# Patient Record
Sex: FEMALE | Race: OTHER | NOT HISPANIC OR LATINO | URBAN - METROPOLITAN AREA
[De-identification: names, ages, dates, MRNs, and addresses within clinical notes are randomized per-mention and may not be internally consistent; named-entity substitution may affect disease eponyms.]

---
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
## 2018-12-07 ENCOUNTER — TRANSCRIPTION ENCOUNTER (OUTPATIENT)
Age: 18
End: 2018-12-07

## 2018-12-07 ENCOUNTER — APPOINTMENT (OUTPATIENT)
Dept: FAMILY MEDICINE | Facility: CLINIC | Age: 18
End: 2018-12-07
Payer: MEDICAID

## 2018-12-07 VITALS
BODY MASS INDEX: 27.43 KG/M2 | HEIGHT: 68 IN | DIASTOLIC BLOOD PRESSURE: 80 MMHG | WEIGHT: 181 LBS | HEART RATE: 86 BPM | TEMPERATURE: 98.3 F | RESPIRATION RATE: 16 BRPM | OXYGEN SATURATION: 98 % | SYSTOLIC BLOOD PRESSURE: 118 MMHG

## 2018-12-07 DIAGNOSIS — Z01.84 ENCOUNTER FOR ANTIBODY RESPONSE EXAMINATION: ICD-10-CM

## 2018-12-07 DIAGNOSIS — Z30.09 ENCOUNTER FOR OTHER GENERAL COUNSELING AND ADVICE ON CONTRACEPTION: ICD-10-CM

## 2018-12-07 DIAGNOSIS — Z11.4 ENCOUNTER FOR SCREENING FOR HUMAN IMMUNODEFICIENCY VIRUS [HIV]: ICD-10-CM

## 2018-12-07 DIAGNOSIS — Z11.1 ENCOUNTER FOR SCREENING FOR RESPIRATORY TUBERCULOSIS: ICD-10-CM

## 2018-12-07 DIAGNOSIS — Z13.220 ENCOUNTER FOR SCREENING FOR LIPOID DISORDERS: ICD-10-CM

## 2018-12-07 DIAGNOSIS — Z82.49 FAMILY HISTORY OF ISCHEMIC HEART DISEASE AND OTHER DISEASES OF THE CIRCULATORY SYSTEM: ICD-10-CM

## 2018-12-07 DIAGNOSIS — Z00.00 ENCOUNTER FOR GENERAL ADULT MEDICAL EXAMINATION W/OUT ABNORMAL FINDINGS: ICD-10-CM

## 2018-12-07 DIAGNOSIS — F17.200 NICOTINE DEPENDENCE, UNSPECIFIED, UNCOMPLICATED: ICD-10-CM

## 2018-12-07 DIAGNOSIS — Z13.29 ENCOUNTER FOR SCREENING FOR OTHER SUSPECTED ENDOCRINE DISORDER: ICD-10-CM

## 2018-12-07 DIAGNOSIS — Z83.79 FAMILY HISTORY OF OTHER DISEASES OF THE DIGESTIVE SYSTEM: ICD-10-CM

## 2018-12-07 DIAGNOSIS — Z13.31 ENCOUNTER FOR SCREENING FOR DEPRESSION: ICD-10-CM

## 2018-12-07 DIAGNOSIS — Z71.6 TOBACCO ABUSE COUNSELING: ICD-10-CM

## 2018-12-07 DIAGNOSIS — Z23 ENCOUNTER FOR IMMUNIZATION: ICD-10-CM

## 2018-12-07 PROCEDURE — G0008: CPT

## 2018-12-07 PROCEDURE — 90686 IIV4 VACC NO PRSV 0.5 ML IM: CPT

## 2018-12-07 PROCEDURE — G0444 DEPRESSION SCREEN ANNUAL: CPT

## 2018-12-07 PROCEDURE — 99385 PREV VISIT NEW AGE 18-39: CPT | Mod: 25

## 2018-12-07 PROCEDURE — 36415 COLL VENOUS BLD VENIPUNCTURE: CPT

## 2018-12-07 PROCEDURE — 99406 BEHAV CHNG SMOKING 3-10 MIN: CPT

## 2018-12-07 RX ORDER — NORGESTIMATE AND ETHINYL ESTRADIOL 7DAYSX3 28
0.18/0.215/0.25 KIT ORAL DAILY
Qty: 6 | Refills: 1 | Status: ACTIVE | COMMUNITY
Start: 2018-12-07 | End: 1900-01-01

## 2018-12-07 RX ORDER — OMEPRAZOLE 40 MG/1
40 CAPSULE, DELAYED RELEASE ORAL
Qty: 30 | Refills: 0 | Status: ACTIVE | COMMUNITY
Start: 2018-12-07 | End: 1900-01-01

## 2018-12-07 NOTE — HEALTH RISK ASSESSMENT
[Good] : ~his/her~  mood as  good [No falls in past year] : Patient reported no falls in the past year [0] : 2) Feeling down, depressed, or hopeless: Not at all (0) [HIV Test offered] : HIV Test offered [None] : None [With Family] : lives with family [Employed] : employed [Single] : single [Sexually Active] : sexually active [Feels Safe at Home] : Feels safe at home [Fully functional (bathing, dressing, toileting, transferring, walking, feeding)] : Fully functional (bathing, dressing, toileting, transferring, walking, feeding) [Fully functional (using the telephone, shopping, preparing meals, housekeeping, doing laundry, using] : Fully functional and needs no help or supervision to perform IADLs (using the telephone, shopping, preparing meals, housekeeping, doing laundry, using transportation, managing medications and managing finances) [Smoke Detector] : smoke detector [Carbon Monoxide Detector] : carbon monoxide detector [Seat Belt] :  uses seat belt [Sunscreen] : uses sunscreen [Patient declined discussion] : Patient declined discussion [] : No [Change in mental status noted] : No change in mental status noted [Language] : denies difficulty with language [Behavior] : denies difficulty with behavior [Reasoning] : denies difficulty with reasoning [High Risk Behavior] : no high risk behavior [Reports changes in hearing] : Reports no changes in hearing [Reports changes in vision] : Reports no changes in vision [Reports changes in dental health] : Reports no changes in dental health [FreeTextEntry2] :

## 2018-12-07 NOTE — COUNSELING
[Healthy eating counseling provided] : healthy eating [Activity counseling provided] : activity [Good understanding] : Patient has a good understanding of lifestyle changes and the steps needed to achieve self management goals [Tobacco Use Cessation Intermediate Greater Than 3 Minutes Up to 10 Minutes] : Tobacco Use Cessation Intermediate Greater Than 3 Minutes Up to 10 Minutes [ - Annual Depression Screening] : Annual Depression Screening

## 2018-12-07 NOTE — PAST MEDICAL HISTORY
[Menstruating] : menstruating [Definite ___ (Date)] : the last menstrual period was [unfilled] [Irregular Cycle Intervals] : are  irregular

## 2018-12-07 NOTE — ASSESSMENT
[FreeTextEntry1] : Health maintenance\par - comprehensive labs\par - immunity testing and tb screen for work forms\par - flu shot given today\par - will get record of tdap \par - will go for pap when age 19\par \par Birth control\par - patient has tried depo and IUD\par - she is now having irregular periods since stopping depo\par - would like to start OCPs\par - script sent today\par \par Nausea\par - intermittent in the morning for about 1 year now\par - ? GERD\par - trial of PPI\par \par Depression screening\par - PHQ score 0\par - negative screening\par \par Smoking cessation\par - spent more than 3 minutes discussing quitting \par - patient is interested\par - will work on cutting down\par - if needed will try a medication option, either chantix or wellbutrin

## 2018-12-07 NOTE — HISTORY OF PRESENT ILLNESS
[FreeTextEntry1] : cpe [de-identified] : Patient is here today for a physical.  She had her last physical about one year ago.  She needs a form filled out for work.  \par \par She has been doing well.  No major complaints.  No chronic illnesses. \par She did have a right knee injury, meniscal tear that was repaired with arthroscopy.

## 2018-12-10 ENCOUNTER — APPOINTMENT (OUTPATIENT)
Dept: FAMILY MEDICINE | Facility: CLINIC | Age: 18
End: 2018-12-10
Payer: MEDICAID

## 2018-12-10 DIAGNOSIS — Z23 ENCOUNTER FOR IMMUNIZATION: ICD-10-CM

## 2018-12-10 LAB
25(OH)D3 SERPL-MCNC: 17.8 NG/ML
ALBUMIN SERPL ELPH-MCNC: 4.8 G/DL
ALP BLD-CCNC: 62 U/L
ALT SERPL-CCNC: 9 U/L
ANION GAP SERPL CALC-SCNC: 12 MMOL/L
AST SERPL-CCNC: 19 U/L
BASOPHILS # BLD AUTO: 0.03 K/UL
BASOPHILS NFR BLD AUTO: 0.4 %
BILIRUB SERPL-MCNC: 0.5 MG/DL
BUN SERPL-MCNC: 11 MG/DL
CALCIUM SERPL-MCNC: 9.8 MG/DL
CHLORIDE SERPL-SCNC: 107 MMOL/L
CHOLEST SERPL-MCNC: 152 MG/DL
CHOLEST/HDLC SERPL: 2.5 RATIO
CO2 SERPL-SCNC: 21 MMOL/L
CREAT SERPL-MCNC: 0.74 MG/DL
EOSINOPHIL # BLD AUTO: 0.08 K/UL
EOSINOPHIL NFR BLD AUTO: 1 %
GLUCOSE SERPL-MCNC: 93 MG/DL
HBV SURFACE AB SER QL: REACTIVE
HCT VFR BLD CALC: 43.7 %
HDLC SERPL-MCNC: 60 MG/DL
HGB BLD-MCNC: 14.3 G/DL
HIV1+2 AB SPEC QL IA.RAPID: NONREACTIVE
IMM GRANULOCYTES NFR BLD AUTO: 0.1 %
LDLC SERPL CALC-MCNC: 85 MG/DL
LYMPHOCYTES # BLD AUTO: 2.19 K/UL
LYMPHOCYTES NFR BLD AUTO: 27.3 %
M TB IFN-G BLD-IMP: NEGATIVE
MAN DIFF?: NORMAL
MCHC RBC-ENTMCNC: 29.7 PG
MCHC RBC-ENTMCNC: 32.7 GM/DL
MCV RBC AUTO: 90.9 FL
MEV IGG FLD QL IA: 9.3 AU/ML
MEV IGG+IGM SER-IMP: NEGATIVE
MONOCYTES # BLD AUTO: 0.5 K/UL
MONOCYTES NFR BLD AUTO: 6.2 %
MUV AB SER-ACNC: POSITIVE
MUV IGG SER QL IA: 12.6 AU/ML
NEUTROPHILS # BLD AUTO: 5.22 K/UL
NEUTROPHILS NFR BLD AUTO: 65 %
PLATELET # BLD AUTO: 250 K/UL
POTASSIUM SERPL-SCNC: 4.3 MMOL/L
PROT SERPL-MCNC: 7.6 G/DL
QUANTIFERON TB PLUS MITOGEN MINUS NIL: 9.55 IU/ML
QUANTIFERON TB PLUS NIL: 0.02 IU/ML
QUANTIFERON TB PLUS TB1 MINUS NIL: 0.05 IU/ML
QUANTIFERON TB PLUS TB2 MINUS NIL: 0.03 IU/ML
RBC # BLD: 4.81 M/UL
RBC # FLD: 12.8 %
RUBV IGG FLD-ACNC: 2 INDEX
RUBV IGG SER-IMP: POSITIVE
SODIUM SERPL-SCNC: 140 MMOL/L
T4 FREE SERPL-MCNC: 1.7 NG/DL
TRIGL SERPL-MCNC: 33 MG/DL
TSH SERPL-ACNC: 1.31 UIU/ML
VZV AB TITR SER: POSITIVE
VZV IGG SER IF-ACNC: 186.1 INDEX
WBC # FLD AUTO: 8.03 K/UL

## 2018-12-10 PROCEDURE — 90707 MMR VACCINE SC: CPT

## 2018-12-10 PROCEDURE — 90471 IMMUNIZATION ADMIN: CPT

## 2018-12-17 ENCOUNTER — APPOINTMENT (OUTPATIENT)
Dept: FAMILY MEDICINE | Facility: CLINIC | Age: 18
End: 2018-12-17
Payer: MEDICAID

## 2018-12-17 VITALS
TEMPERATURE: 98.4 F | HEART RATE: 82 BPM | DIASTOLIC BLOOD PRESSURE: 84 MMHG | HEIGHT: 68 IN | RESPIRATION RATE: 16 BRPM | SYSTOLIC BLOOD PRESSURE: 122 MMHG | WEIGHT: 181 LBS | OXYGEN SATURATION: 98 % | BODY MASS INDEX: 27.43 KG/M2

## 2018-12-17 DIAGNOSIS — J06.9 ACUTE UPPER RESPIRATORY INFECTION, UNSPECIFIED: ICD-10-CM

## 2018-12-17 DIAGNOSIS — R11.0 NAUSEA: ICD-10-CM

## 2018-12-17 PROCEDURE — 99214 OFFICE O/P EST MOD 30 MIN: CPT

## 2018-12-17 RX ORDER — FLUTICASONE PROPIONATE 50 UG/1
50 SPRAY, METERED NASAL
Qty: 1 | Refills: 1 | Status: ACTIVE | COMMUNITY
Start: 2018-12-17 | End: 1900-01-01

## 2018-12-17 NOTE — ASSESSMENT
[FreeTextEntry1] : URI\par - likely viral\par - resolving, fever free for over 24 hours\par - continue supportive care, flonase\par - may return to work\par \par Nausea\par - much improved with PPI\par - will continue for now

## 2018-12-17 NOTE — REVIEW OF SYSTEMS
[Sore Throat] : sore throat [Postnasal Drip] : postnasal drip [Negative] : Neurological [Nasal Discharge] : no nasal discharge [FreeTextEntry3] : watery eyes

## 2018-12-17 NOTE — HISTORY OF PRESENT ILLNESS
[FreeTextEntry8] : Patient is here today for an acute visit for cold symptoms.  She started having symptoms 5 days ago and was having fevers and sweating.  Her fever was as high as 103 F.  She was taking ibuprofen for that.  She also had sneezing, congestion, watery eyes, and mild cough.  She has been taking theraflu and nyquil which has been helping.  SHe is feeling much better and has been fever free for 2 days.  She needs a note to return to work.

## 2018-12-17 NOTE — PHYSICAL EXAM
[No Acute Distress] : no acute distress [Well Nourished] : well nourished [Well Developed] : well developed [Well-Appearing] : well-appearing [Normal Sclera/Conjunctiva] : normal sclera/conjunctiva [PERRL] : pupils equal round and reactive to light [EOMI] : extraocular movements intact [Normal Outer Ear/Nose] : the outer ears and nose were normal in appearance [Normal Oropharynx] : the oropharynx was normal [Normal TMs] : both tympanic membranes were normal [Supple] : supple [No Lymphadenopathy] : no lymphadenopathy [No Respiratory Distress] : no respiratory distress  [Clear to Auscultation] : lungs were clear to auscultation bilaterally [No Accessory Muscle Use] : no accessory muscle use [Normal Rate] : normal rate  [Regular Rhythm] : with a regular rhythm [Normal S1, S2] : normal S1 and S2 [No Murmur] : no murmur heard [Pedal Pulses Present] : the pedal pulses are present [No Edema] : there was no peripheral edema [Normal Posterior Cervical Nodes] : no posterior cervical lymphadenopathy [Normal Anterior Cervical Nodes] : no anterior cervical lymphadenopathy [No CVA Tenderness] : no CVA  tenderness [No Spinal Tenderness] : no spinal tenderness [Grossly Normal Strength/Tone] : grossly normal strength/tone [No Rash] : no rash [Normal Gait] : normal gait [Normal Affect] : the affect was normal [Normal Insight/Judgement] : insight and judgment were intact

## 2019-01-03 ENCOUNTER — APPOINTMENT (OUTPATIENT)
Dept: FAMILY MEDICINE | Facility: CLINIC | Age: 19
End: 2019-01-03

## 2019-01-22 ENCOUNTER — APPOINTMENT (OUTPATIENT)
Dept: FAMILY MEDICINE | Facility: CLINIC | Age: 19
End: 2019-01-22

## 2019-08-16 ENCOUNTER — TRANSCRIPTION ENCOUNTER (OUTPATIENT)
Age: 19
End: 2019-08-16

## 2019-11-04 ENCOUNTER — RESULT REVIEW (OUTPATIENT)
Age: 19
End: 2019-11-04

## 2020-01-20 ENCOUNTER — APPOINTMENT (OUTPATIENT)
Dept: FAMILY MEDICINE | Facility: CLINIC | Age: 20
End: 2020-01-20

## 2020-12-16 PROBLEM — J06.9 ACUTE URI: Status: RESOLVED | Noted: 2018-12-17 | Resolved: 2020-12-16

## 2021-01-12 ENCOUNTER — EMERGENCY (EMERGENCY)
Facility: HOSPITAL | Age: 21
LOS: 0 days | Discharge: HOME | End: 2021-01-13
Attending: EMERGENCY MEDICINE
Payer: MEDICAID

## 2021-01-12 VITALS — HEIGHT: 67 IN | WEIGHT: 158.07 LBS

## 2021-01-12 DIAGNOSIS — O03.9 COMPLETE OR UNSPECIFIED SPONTANEOUS ABORTION WITHOUT COMPLICATION: ICD-10-CM

## 2021-01-12 DIAGNOSIS — N93.9 ABNORMAL UTERINE AND VAGINAL BLEEDING, UNSPECIFIED: ICD-10-CM

## 2021-01-12 LAB
ALBUMIN SERPL ELPH-MCNC: 3.8 G/DL — SIGNIFICANT CHANGE UP (ref 3.3–5)
ALP SERPL-CCNC: 48 U/L — SIGNIFICANT CHANGE UP (ref 40–120)
ALT FLD-CCNC: 16 U/L — SIGNIFICANT CHANGE UP (ref 12–78)
ANION GAP SERPL CALC-SCNC: 7 MMOL/L — SIGNIFICANT CHANGE UP (ref 5–17)
AST SERPL-CCNC: 14 U/L — LOW (ref 15–37)
BASOPHILS # BLD AUTO: 0.05 K/UL — SIGNIFICANT CHANGE UP (ref 0–0.2)
BASOPHILS NFR BLD AUTO: 0.4 % — SIGNIFICANT CHANGE UP (ref 0–2)
BILIRUB SERPL-MCNC: 0.6 MG/DL — SIGNIFICANT CHANGE UP (ref 0.2–1.2)
BUN SERPL-MCNC: 8 MG/DL — SIGNIFICANT CHANGE UP (ref 7–23)
CALCIUM SERPL-MCNC: 9.1 MG/DL — SIGNIFICANT CHANGE UP (ref 8.5–10.1)
CHLORIDE SERPL-SCNC: 111 MMOL/L — HIGH (ref 96–108)
CO2 SERPL-SCNC: 23 MMOL/L — SIGNIFICANT CHANGE UP (ref 22–31)
CREAT SERPL-MCNC: 0.59 MG/DL — SIGNIFICANT CHANGE UP (ref 0.5–1.3)
EOSINOPHIL # BLD AUTO: 0.06 K/UL — SIGNIFICANT CHANGE UP (ref 0–0.5)
EOSINOPHIL NFR BLD AUTO: 0.5 % — SIGNIFICANT CHANGE UP (ref 0–6)
GLUCOSE SERPL-MCNC: 96 MG/DL — SIGNIFICANT CHANGE UP (ref 70–99)
HCG SERPL-ACNC: 943 MIU/ML — HIGH
HCT VFR BLD CALC: 36.6 % — SIGNIFICANT CHANGE UP (ref 34.5–45)
HGB BLD-MCNC: 12.6 G/DL — SIGNIFICANT CHANGE UP (ref 11.5–15.5)
IMM GRANULOCYTES NFR BLD AUTO: 0.2 % — SIGNIFICANT CHANGE UP (ref 0–1.5)
LYMPHOCYTES # BLD AUTO: 27.4 % — SIGNIFICANT CHANGE UP (ref 13–44)
LYMPHOCYTES # BLD AUTO: 3.17 K/UL — SIGNIFICANT CHANGE UP (ref 1–3.3)
MCHC RBC-ENTMCNC: 30 PG — SIGNIFICANT CHANGE UP (ref 27–34)
MCHC RBC-ENTMCNC: 34.4 GM/DL — SIGNIFICANT CHANGE UP (ref 32–36)
MCV RBC AUTO: 87.1 FL — SIGNIFICANT CHANGE UP (ref 80–100)
MONOCYTES # BLD AUTO: 0.75 K/UL — SIGNIFICANT CHANGE UP (ref 0–0.9)
MONOCYTES NFR BLD AUTO: 6.5 % — SIGNIFICANT CHANGE UP (ref 2–14)
NEUTROPHILS # BLD AUTO: 7.52 K/UL — HIGH (ref 1.8–7.4)
NEUTROPHILS NFR BLD AUTO: 65 % — SIGNIFICANT CHANGE UP (ref 43–77)
PLATELET # BLD AUTO: 253 K/UL — SIGNIFICANT CHANGE UP (ref 150–400)
POTASSIUM SERPL-MCNC: 3.8 MMOL/L — SIGNIFICANT CHANGE UP (ref 3.5–5.3)
POTASSIUM SERPL-SCNC: 3.8 MMOL/L — SIGNIFICANT CHANGE UP (ref 3.5–5.3)
PROT SERPL-MCNC: 7.1 GM/DL — SIGNIFICANT CHANGE UP (ref 6–8.3)
RBC # BLD: 4.2 M/UL — SIGNIFICANT CHANGE UP (ref 3.8–5.2)
RBC # FLD: 12.3 % — SIGNIFICANT CHANGE UP (ref 10.3–14.5)
SODIUM SERPL-SCNC: 141 MMOL/L — SIGNIFICANT CHANGE UP (ref 135–145)
WBC # BLD: 11.57 K/UL — HIGH (ref 3.8–10.5)
WBC # FLD AUTO: 11.57 K/UL — HIGH (ref 3.8–10.5)

## 2021-01-12 PROCEDURE — 86850 RBC ANTIBODY SCREEN: CPT

## 2021-01-12 PROCEDURE — 99284 EMERGENCY DEPT VISIT MOD MDM: CPT | Mod: 25

## 2021-01-12 PROCEDURE — 80053 COMPREHEN METABOLIC PANEL: CPT

## 2021-01-12 PROCEDURE — 85025 COMPLETE CBC W/AUTO DIFF WBC: CPT

## 2021-01-12 PROCEDURE — 86900 BLOOD TYPING SEROLOGIC ABO: CPT

## 2021-01-12 PROCEDURE — 96374 THER/PROPH/DIAG INJ IV PUSH: CPT

## 2021-01-12 PROCEDURE — 76801 OB US < 14 WKS SINGLE FETUS: CPT

## 2021-01-12 PROCEDURE — 76801 OB US < 14 WKS SINGLE FETUS: CPT | Mod: 26

## 2021-01-12 PROCEDURE — 86901 BLOOD TYPING SEROLOGIC RH(D): CPT

## 2021-01-12 PROCEDURE — 84702 CHORIONIC GONADOTROPIN TEST: CPT

## 2021-01-12 PROCEDURE — 85610 PROTHROMBIN TIME: CPT

## 2021-01-12 PROCEDURE — 36415 COLL VENOUS BLD VENIPUNCTURE: CPT

## 2021-01-12 PROCEDURE — 99284 EMERGENCY DEPT VISIT MOD MDM: CPT

## 2021-01-12 RX ORDER — KETOROLAC TROMETHAMINE 30 MG/ML
30 SYRINGE (ML) INJECTION ONCE
Refills: 0 | Status: DISCONTINUED | OUTPATIENT
Start: 2021-01-12 | End: 2021-01-12

## 2021-01-12 RX ORDER — ACETAMINOPHEN 500 MG
1000 TABLET ORAL ONCE
Refills: 0 | Status: COMPLETED | OUTPATIENT
Start: 2021-01-12 | End: 2021-01-12

## 2021-01-12 RX ORDER — SODIUM CHLORIDE 9 MG/ML
1000 INJECTION INTRAMUSCULAR; INTRAVENOUS; SUBCUTANEOUS ONCE
Refills: 0 | Status: COMPLETED | OUTPATIENT
Start: 2021-01-12 | End: 2021-01-12

## 2021-01-12 RX ADMIN — Medication 30 MILLIGRAM(S): at 23:21

## 2021-01-12 RX ADMIN — SODIUM CHLORIDE 1000 MILLILITER(S): 9 INJECTION INTRAMUSCULAR; INTRAVENOUS; SUBCUTANEOUS at 22:40

## 2021-01-12 RX ADMIN — Medication 1000 MILLIGRAM(S): at 23:21

## 2021-01-12 NOTE — ED STATDOCS - PHYSICAL EXAMINATION
PA NOTE: GEN: AOX3, NAD. HEENT: Throat clear. Airway is patent. EYES: PERRLA. EOMI. Head: NC/AT. NECK: Supple, No JVD. FROM. C-spine non-tender. CV:S1S2, RRR, LUNGS: Non-labored breathing, no tachypnea. O2sat 100% RA. CTA b/l. No w/r/r. CHEST: Equal chest expansion and rise. No deformity. ABD: Soft, NT/ND, no rebound, no guarding. No CVAT. EXT: No e/c/c. 2+ distal pulses. SKIN: No rashes. NEURO: No focal deficits. CN II-XII intact. FROM. 5/5 motor and sensory. ~Sp Felix PA-C

## 2021-01-12 NOTE — ED STATDOCS - PROGRESS NOTE DETAILS
PA: Patient is a 21 y/o female with PMHx of miscarriage x1 in 2020, who presents to [facility] at 13 weeks gestation c/o vaginal bleeding x2 hours PTA with clots and mild intermittent lower abd pain. LMP October 15th. DENIES N/V/D, fever or chills. Patient is scheduled to see her OB GYN in 10 days. ~Sp Felix PA-C   Patient seen and evaluated in . Will get labs, pelvic SONO. Reassess. ~Sp Felix PA-C   OBGYN: Dr. Cardoso PA note: Pelvic SONO +IUP without fetal pole. T&S O+keira antibody NEG. All labwork and studies reviewed in details with patient. Patient re-examined and re-evaluated. Patient feels much better at this time. ED evaluation, Diagnosis and management discussed with the patient in detail. Workup results discussed with ED attending YURIDIA Orozco to [place] home. Close OB GYN follow up encouraged.  Strict ED return instructions discussed in detail and patient given the opportunity to ask any questions about their discharge diagnosis and instructions. Patient verbalized understanding. ~ Sp Felix PA-C

## 2021-01-12 NOTE — ED STATDOCS - PATIENT PORTAL LINK FT
You can access the FollowMyHealth Patient Portal offered by [facility] by registering at the following website: [email or web address]. By joining [employer]s FollowMyHealth portal, you will also be able to view your health information using other applications (apps) compatible with our system.

## 2021-01-12 NOTE — ED STATDOCS - OBJECTIVE STATEMENT
19 y/o F with no pertinent PMHx presenting to the ED 13 weeks pregnant(G:3 P:0 A:2) presenting to the ED c/o bleeding x2 hours PTA. +mild intermittent abd pain Last menstrual period October 15th. Came to the ED for further evaluation. Denies any N/V/D, fever or chills.   OBGYN: Dr. Cardoso

## 2021-01-12 NOTE — ED STATDOCS - ATTENDING CONTRIBUTION TO CARE
Dr. Mendoza: I performed a face to face bedside interview with patient regarding history of present illness, review of symptoms and past medical history. I completed an independent physical exam.  I have discussed patient's plan of care with PA.   I agree with note as stated above, having amended the EMR as needed to reflect my findings.   This includes HISTORY OF PRESENT ILLNESS, HIV, PAST MEDICAL/SURGICAL/FAMILY/SOCIAL HISTORY, ALLERGIES AND HOME MEDICATIONS, REVIEW OF SYSTEMS, PHYSICAL EXAM, and any PROGRESS NOTES during the time I functioned as the attending physician for this patient.

## 2021-01-12 NOTE — ED STATDOCS - CLINICAL SUMMARY MEDICAL DECISION MAKING FREE TEXT BOX
Patient with vaginal bleeding while pregnant, threatened AB. Plan: blood work  including type in screen, US to r/o AB. Patient with vaginal bleeding while pregnant, threatened AB. Plan: blood work  including type in screen, US to r/o AB.      PA: Patient is a 21 y/o female with PMHx of miscarriage x1 in 2020, who presents to [facility] at 13 weeks gestation c/o vaginal bleeding x2 hours PTA with clots and mild intermittent lower abd pain. Pelvic sono +IUP without fetal pole, gestational age of 8 weeks/5 days. No FHR. Patient notified of sono results by dr. Mendoza. Waiting on labs, T&S. Care transitioned to dr. Mendoza. ~Sp Felix PA-C Patient with vaginal bleeding while pregnant, threatened AB. Plan: blood work  including type in screen, US to r/o AB.      1/12/2021 2350 hrs PA: Patient is a 19 y/o female with PMHx of miscarriage x1 in 2020, who presents to [facility] at 13 weeks gestation c/o vaginal bleeding x2 hours PTA with clots and mild intermittent lower abd pain. Pelvic sono +IUP without fetal pole, gestational age of 8 weeks/5 days. No FHR. Patient notified of sono results by dr. Mendoza. Waiting on labs, T&S. Care transitioned to dr. Mendoza. ~Sp Felix PA-C    1/13/2021 00:28 hrs PA note: Pelvic SONO +IUP without fetal pole. T&S O+keira antibody NEG. All labwork and studies reviewed in details with patient. Patient re-examined and re-evaluated. Patient feels much better at this time. ED evaluation, Diagnosis and management discussed with the patient in detail. Workup results discussed with ED attending Dr. Mendoza, [place] to [place] home. Close OB GYN follow up encouraged.  Strict ED return instructions discussed in detail and patient given the opportunity to ask any questions about their discharge diagnosis and instructions. Patient verbalized understanding. ~ Sp Felix PA-C

## 2021-01-12 NOTE — ED STATDOCS - NSFOLLOWUPINSTRUCTIONS_ED_ALL_ED_FT
MISCARRIAGE - General Information           Miscarriage    WHAT YOU NEED TO KNOW:    What is a miscarriage? A miscarriage is the loss of a fetus before 20 weeks of pregnancy. A miscarriage may also be called a spontaneous  or an early pregnancy loss.     What causes a miscarriage? The cause of your miscarriage may not be known. The following may increase the risk:   •Being 35 years or older      •Genetic problems in the fetus      •Poorly controlled diabetes, high blood pressure, or thyroid disease      •An infection such as toxoplasmosis or syphilis      •Drinking alcohol or caffeine, smoking, or using drugs during pregnancy      •Being overweight or underweight      •Problems with the uterus, placenta, or cervix      What are the signs and symptoms of a miscarriage? You may not have symptoms of a miscarriage or you may have any of the following:   •Vaginal spotting or heavy bleeding      •Pain or cramping in your abdomen or lower back      •Discharge of bloody fluid, tissue, or blood clots from your vagina      •Nausea or vomiting      •Dizziness      How is a miscarriage treated? You may not need treatment for a miscarriage. You may need any of the following if you have heavy bleeding or tissue left in your uterus after the miscarriage:   •Medicine may be given to control bleeding and prevent infection. Medicine may also be given to control pain and prevent complications in future pregnancies.       •Surgery may be needed to remove the tissue left in your uterus. Surgery may include a dilation and curettage (D&C) or a dilation and evacuation (D&E). Surgery may also be needed to control bleeding or prevent an infection.       How can I care for myself after a miscarriage?   •Do not put anything in your vagina for 2 weeks or as directed. Do not use tampons, douche, or have sex. These actions can cause infection and pain.       •Use sanitary pads as needed. You may have light bleeding or spotting for 2 weeks.       •Do not take a bath or go swimming for 2 weeks or as directed. These actions may increase your risk for an infection. Take showers only.       •Rest as needed. Slowly start to do more each day. Return to your daily activities as directed.       •Talk to your healthcare provider about birth control. If you would like to prevent another pregnancy, ask your healthcare provider which type of birth control is best for you.       •Join a support group or therapy to help you cope. A miscarriage may be very difficult for you, your partner, and other members of your family. There is no right way to feel after a miscarriage. You may feel overwhelming grief or other emotions. It may be helpful to talk to a friend, family member, or counselor about your feelings. You may worry that you could have another miscarriage. Talk to your healthcare provider about your concerns. He may be able to help you reduce the risk for another miscarriage. He may also help you find ways to cope with grief.       Where can I find more information?   •The American College of Ob[employer] Gynec[employer]  [address] [address]  [place] [address]  Phone: [phone]  Phone: 1-169.442.7880  Web Address: http://www.acog.org      •[facility]  [address] [place] [address]  Web Address: http://www.Chalkable        When should I seek immediate care?   •You have foul-smelling drainage or pus coming from your vagina.      •You have heavy vaginal bleeding and soak 1 pad or more in an hour.       •You have severe abdominal pain.      •You feel like your heart is beating faster than normal.       •You feel extremely weak or dizzy.       When should I contact my healthcare provider?   •You have a fever greater than 100.4°F or chills.       •You have extreme sadness, grief, or feel unable to cope with what has happened.       •You have questions or concerns about your condition or care.      CARE AGREEMENT:    You have the right to help plan your care. Learn about your health condition and how it may be treated. Discuss treatment options with your healthcare providers to decide what care you want to receive. You always have the right to refuse treatment.

## 2021-01-12 NOTE — ED ADULT TRIAGE NOTE - CHIEF COMPLAINT QUOTE
13 weeks pregnant c/o vaginal bleeding x 2 days. 6 pads/hr. + clots. hx miscarriage.  Andrea Graham Are in [place].

## 2021-01-13 VITALS
RESPIRATION RATE: 18 BRPM | HEART RATE: 89 BPM | OXYGEN SATURATION: 98 % | SYSTOLIC BLOOD PRESSURE: 117 MMHG | DIASTOLIC BLOOD PRESSURE: 68 MMHG | TEMPERATURE: 99 F

## 2021-01-13 NOTE — ED ADULT NURSE NOTE - CHIEF COMPLAINT QUOTE
13 weeks pregnant c/o vaginal bleeding x 2 days. 6 pads/hr. + clots. hx miscarriage.  Andrea Graham Are in [place].

## 2021-01-13 NOTE — ED ADULT NURSE NOTE - OBJECTIVE STATEMENT
Patient presents to the ed ambulatory with family member co lower abdominal cramping and vaginal bleeding with clots. Pt states that she is 13 weeks pregnant and believes she is having a miscarriage. Pt is , no other known complications with this pregnancy and no trauma noted. Pt is awake and alert at this time, actively bleeding in the ed and passing clots. Pt only complaint is lower abdominal cramping, pt denies nausea and dizziness at this time. Pt stating that she does not want to stay and she "just wants to go home." Pt is tearful and anxious while in the Ed, patient is actively crying states she has her mom as her support system.

## 2021-06-21 NOTE — ED PROVIDER NOTE - CPE EDP SKIN NORM
Letter by Epifanio Ibarra MD (Ted) at      Author: Epifanio Ibarra MD (Ted) Service: -- Author Type: --    Filed:  Encounter Date: 4/28/2021 Status: (Other)         Mariella Hannon  [address] [place] [address]      April 28, 2021      Dear Mariella,    This letter is to remind you that you are due for your follow up appointment with Dr. Blair Ibarra and nichol . To help ensure you are in the best health possible, a regular follow-up with your cardiologist is essential.     Please call our Patient Scheduling Line at [phone] to schedule your appointment at your earliest convenience.  If you have recently scheduled an appointment, please disregard this letter.    We look forward to seeing you again. As always, we are available at the number  above for any questions or concerns you may have.      Sincerely,     The Phys[facility] and [facility]       
normal...

## 2022-03-03 ENCOUNTER — OUTPATIENT (OUTPATIENT)
Dept: INPATIENT UNIT | Facility: HOSPITAL | Age: 22
LOS: 1 days | End: 2022-03-03
Payer: COMMERCIAL

## 2022-03-03 VITALS — HEART RATE: 80 BPM | SYSTOLIC BLOOD PRESSURE: 129 MMHG | DIASTOLIC BLOOD PRESSURE: 76 MMHG

## 2022-03-03 DIAGNOSIS — O47.03 FALSE LABOR BEFORE 37 COMPLETED WEEKS OF GESTATION, THIRD TRIMESTER: ICD-10-CM

## 2022-03-03 PROCEDURE — 99203 OFFICE O/P NEW LOW 30 MIN: CPT

## 2022-03-03 NOTE — OB RN TRIAGE NOTE - FALL HARM RISK - UNIVERSAL INTERVENTIONS
Bed in lowest position, wheels locked, appropriate side rails in place/Call bell, personal items and telephone in reach/Instruct patient to call for assistance before getting out of bed or chair/Non-slip footwear when patient is out of bed/[place] to call system/Physically safe environment - no spills, clutter or unnecessary equipment/Purposeful Proactive Rounding/Room/bathroom lighting operational, light cord in reach

## 2022-03-04 ENCOUNTER — OUTPATIENT (OUTPATIENT)
Dept: OUTPATIENT SERVICES | Facility: HOSPITAL | Age: 22
LOS: 1 days | End: 2022-03-04
Payer: COMMERCIAL

## 2022-03-04 VITALS — HEART RATE: 81 BPM | DIASTOLIC BLOOD PRESSURE: 79 MMHG | SYSTOLIC BLOOD PRESSURE: 123 MMHG

## 2022-03-04 DIAGNOSIS — O47.03 FALSE LABOR BEFORE 37 COMPLETED WEEKS OF GESTATION, THIRD TRIMESTER: ICD-10-CM

## 2022-03-04 PROBLEM — Z87.59 PERSONAL HISTORY OF OTHER COMPLICATIONS OF PREGNANCY, CHILDBIRTH AND THE PUERPERIUM: Chronic | Status: ACTIVE | Noted: 2021-01-12

## 2022-03-04 PROCEDURE — G0463: CPT

## 2022-03-04 PROCEDURE — 59025 FETAL NON-STRESS TEST: CPT

## 2022-03-04 RX ADMIN — Medication 12 MILLIGRAM(S): at 00:35

## 2022-03-04 NOTE — OB PROVIDER TRIAGE NOTE - NSOBPROVIDERNOTE_OBGYN_ALL_OB_FT
A/P: Patient is a 20yo  at 34w2d with hx of short cervix presenting for r/o PTL  - Vital signs wnl  - Intermittent ctxs  - Cervical exam 1.5//-3, to be rechecked in 2 hours  - PO hydration    Plan D/w Dr. Magallanes    Addendum: Cervix rechecked, same exam, tracing reactive, pt feels like ctxs are less painful  Given hx of short cervix will give BMZ. Pt agrees to return for 2nd dose at midnight tomorrow

## 2022-03-04 NOTE — OB PROVIDER TRIAGE NOTE - ATTENDING COMMENTS
20yo  at 34w2d presenting today for lower abdominal pain worsening throughout the day. Pt with a hx of short cervix.   FHT - reassuring for GA  [place] irregular CTX - spaced out  SVE - stable on 2 exams  20 y/o  @ 34+2 with hx of short  cervix and  contractions   - given dialation will give Betamethasone   - cervical exam stable with reported improvement in pain per patient - so will allow for d/c home and return for beta #2   - Precautions discussed    Kim Magallanes MD

## 2022-03-04 NOTE — OB PROVIDER TRIAGE NOTE - HISTORY OF PRESENT ILLNESS
Patient is a 20yo  at 34w2d presenting today for lower abdominal pain worsening throughout the day. She denies VB, LOF. She feels normal fetal movement  Prenatal course c/b short cervix on progesterone (stopped a week ago)    Obhx: SAB 12w, no D&C  Medhx: none  Surghx: knee surgery  Meds: PNVs  All: NKDA    Physical Exam  General: Alert and oriented x3, NAD  Heart: RRR  Lungs: CTAB  Abd: Soft, nontender, gravid  SVE: 1.5/80/-3  FHT: 140bpm - cat 1  [place]: possible intermittent ctxs on toco Patient is a 20yo  at 34w2d presenting today for lower abdominal pain worsening throughout the day. She denies VB, LOF. She feels normal fetal movement  Prenatal course c/b short cervix on progesterone (stopped a week ago)      Obhx: SAB 12w, no D&C  Medhx: none  Surghx: knee surgery  Meds: PNVs  All: NKDA    Physical Exam  General: Alert and oriented x3, NAD  Heart: RRR  Lungs: CTAB  Abd: Soft, nontender, gravid  SVE: 1.5/80/-3  FHT: 140bpm - cat 1  [place]: possible intermittent ctxs on toco

## 2022-03-05 VITALS
TEMPERATURE: 98 F | SYSTOLIC BLOOD PRESSURE: 125 MMHG | HEART RATE: 77 BPM | DIASTOLIC BLOOD PRESSURE: 65 MMHG | RESPIRATION RATE: 16 BRPM

## 2022-03-05 VITALS — DIASTOLIC BLOOD PRESSURE: 61 MMHG | SYSTOLIC BLOOD PRESSURE: 119 MMHG | HEART RATE: 68 BPM

## 2022-03-05 PROBLEM — A74.9 CHLAMYDIAL INFECTION, UNSPECIFIED: Chronic | Status: ACTIVE | Noted: 2022-03-03

## 2022-03-05 PROBLEM — K29.70 GASTRITIS, UNSPECIFIED, WITHOUT BLEEDING: Chronic | Status: ACTIVE | Noted: 2022-03-03

## 2022-03-05 RX ADMIN — Medication 12 MILLIGRAM(S): at 00:41

## 2022-03-05 NOTE — OB RN TRIAGE NOTE - FALL HARM RISK - UNIVERSAL INTERVENTIONS
Bed in lowest position, wheels locked, appropriate side rails in place/Call bell, personal items and telephone in reach/Instruct patient to call for assistance before getting out of bed or chair/Non-slip footwear when patient is out of bed/[place] to call system/Physically safe environment - no spills, clutter or unnecessary equipment/Purposeful Proactive Rounding/Room/bathroom lighting operational, light cord in reach

## 2022-03-05 NOTE — CHART NOTE - NSCHARTNOTEFT_GEN_A_CORE
Patient here for repeat BMZ  Seen in triage yesterday for r/o PTL, has hx of short cervix and was found to be 2/80/-3  She was given one dose BMZ  She has no complaints today, feels better, no VB, LOF, CTXs. +FM  NST reactive  Okay for d/c home after BMZ injection

## 2022-03-07 ENCOUNTER — INPATIENT (INPATIENT)
Facility: HOSPITAL | Age: 22
LOS: 1 days | Discharge: HOME | End: 2022-03-09
Attending: OBSTETRICS & GYNECOLOGY | Admitting: OBSTETRICS & GYNECOLOGY
Payer: COMMERCIAL

## 2022-03-07 ENCOUNTER — TRANSCRIPTION ENCOUNTER (OUTPATIENT)
Age: 22
End: 2022-03-07

## 2022-03-07 VITALS — SYSTOLIC BLOOD PRESSURE: 130 MMHG | HEART RATE: 68 BPM | DIASTOLIC BLOOD PRESSURE: 76 MMHG

## 2022-03-07 DIAGNOSIS — O26.893 OTHER SPECIFIED PREGNANCY RELATED CONDITIONS, THIRD TRIMESTER: ICD-10-CM

## 2022-03-07 DIAGNOSIS — O47.1 FALSE LABOR AT OR AFTER 37 COMPLETED WEEKS OF GESTATION: ICD-10-CM

## 2022-03-07 DIAGNOSIS — O47.03 FALSE LABOR BEFORE 37 COMPLETED WEEKS OF GESTATION, THIRD TRIMESTER: ICD-10-CM

## 2022-03-07 LAB
BASOPHILS # BLD AUTO: 0 K/UL — SIGNIFICANT CHANGE UP (ref 0–0.2)
BASOPHILS NFR BLD AUTO: 0 % — SIGNIFICANT CHANGE UP (ref 0–2)
BLD GP AB SCN SERPL QL: SIGNIFICANT CHANGE UP
EOSINOPHIL # BLD AUTO: 0 K/UL — SIGNIFICANT CHANGE UP (ref 0–0.5)
EOSINOPHIL NFR BLD AUTO: 0 % — SIGNIFICANT CHANGE UP (ref 0–6)
GIANT PLATELETS BLD QL SMEAR: PRESENT — SIGNIFICANT CHANGE UP
HCT VFR BLD CALC: 34.2 % — LOW (ref 34.5–45)
HGB BLD-MCNC: 11.4 G/DL — LOW (ref 11.5–15.5)
HIV 1 & 2 AB SERPL IA.RAPID: SIGNIFICANT CHANGE UP
LYMPHOCYTES # BLD AUTO: 1.76 K/UL — SIGNIFICANT CHANGE UP (ref 1–3.3)
LYMPHOCYTES # BLD AUTO: 8.9 % — LOW (ref 13–44)
MANUAL SMEAR VERIFICATION: SIGNIFICANT CHANGE UP
MCHC RBC-ENTMCNC: 29.6 PG — SIGNIFICANT CHANGE UP (ref 27–34)
MCHC RBC-ENTMCNC: 33.3 GM/DL — SIGNIFICANT CHANGE UP (ref 32–36)
MCV RBC AUTO: 88.8 FL — SIGNIFICANT CHANGE UP (ref 80–100)
MONOCYTES # BLD AUTO: 1.74 K/UL — HIGH (ref 0–0.9)
MONOCYTES NFR BLD AUTO: 8.8 % — SIGNIFICANT CHANGE UP (ref 2–14)
NEUTROPHILS # BLD AUTO: 16.23 K/UL — HIGH (ref 1.8–7.4)
NEUTROPHILS NFR BLD AUTO: 82.3 % — HIGH (ref 43–77)
OVALOCYTES BLD QL SMEAR: SLIGHT — SIGNIFICANT CHANGE UP
PLAT MORPH BLD: NORMAL — SIGNIFICANT CHANGE UP
PLATELET # BLD AUTO: 265 K/UL — SIGNIFICANT CHANGE UP (ref 150–400)
POIKILOCYTOSIS BLD QL AUTO: SLIGHT — SIGNIFICANT CHANGE UP
POLYCHROMASIA BLD QL SMEAR: SLIGHT — SIGNIFICANT CHANGE UP
RBC # BLD: 3.85 M/UL — SIGNIFICANT CHANGE UP (ref 3.8–5.2)
RBC # FLD: 12 % — SIGNIFICANT CHANGE UP (ref 10.3–14.5)
RBC BLD AUTO: ABNORMAL
SARS-COV-2 RNA SPEC QL NAA+PROBE: SIGNIFICANT CHANGE UP
WBC # BLD: 19.72 K/UL — HIGH (ref 3.8–10.5)
WBC # FLD AUTO: 19.72 K/UL — HIGH (ref 3.8–10.5)

## 2022-03-07 PROCEDURE — 99221 1ST HOSP IP/OBS SF/LOW 40: CPT

## 2022-03-07 RX ORDER — SIMETHICONE 80 MG/1
80 TABLET, CHEWABLE ORAL EVERY 4 HOURS
Refills: 0 | Status: DISCONTINUED | OUTPATIENT
Start: 2022-03-07 | End: 2022-03-09

## 2022-03-07 RX ORDER — LANOLIN
1 OINTMENT (GRAM) TOPICAL EVERY 6 HOURS
Refills: 0 | Status: DISCONTINUED | OUTPATIENT
Start: 2022-03-07 | End: 2022-03-09

## 2022-03-07 RX ORDER — HYDROCORTISONE 1 %
1 OINTMENT (GRAM) TOPICAL EVERY 6 HOURS
Refills: 0 | Status: DISCONTINUED | OUTPATIENT
Start: 2022-03-07 | End: 2022-03-09

## 2022-03-07 RX ORDER — AER TRAVELER 0.5 G/1
1 SOLUTION RECTAL; TOPICAL EVERY 4 HOURS
Refills: 0 | Status: DISCONTINUED | OUTPATIENT
Start: 2022-03-07 | End: 2022-03-09

## 2022-03-07 RX ORDER — AMPICILLIN TRIHYDRATE 250 MG
2 CAPSULE ORAL ONCE
Refills: 0 | Status: COMPLETED | OUTPATIENT
Start: 2022-03-07 | End: 2022-03-07

## 2022-03-07 RX ORDER — IBUPROFEN 200 MG
600 TABLET ORAL EVERY 6 HOURS
Refills: 0 | Status: COMPLETED | OUTPATIENT
Start: 2022-03-07 | End: 2023-02-03

## 2022-03-07 RX ORDER — OXYTOCIN 10 UNIT/ML
333.33 VIAL (ML) INJECTION
Qty: 20 | Refills: 0 | Status: DISCONTINUED | OUTPATIENT
Start: 2022-03-07 | End: 2022-03-07

## 2022-03-07 RX ORDER — SODIUM CHLORIDE 9 MG/ML
1000 INJECTION, SOLUTION INTRAVENOUS
Refills: 0 | Status: DISCONTINUED | OUTPATIENT
Start: 2022-03-07 | End: 2022-03-08

## 2022-03-07 RX ORDER — TETANUS TOXOID, REDUCED DIPHTHERIA TOXOID AND ACELLULAR PERTUSSIS VACCINE, ADSORBED 5; 2.5; 8; 8; 2.5 [IU]/.5ML; [IU]/.5ML; UG/.5ML; UG/.5ML; UG/.5ML
0.5 SUSPENSION INTRAMUSCULAR ONCE
Refills: 0 | Status: COMPLETED | OUTPATIENT
Start: 2022-03-07

## 2022-03-07 RX ORDER — AMPICILLIN TRIHYDRATE 250 MG
1 CAPSULE ORAL EVERY 4 HOURS
Refills: 0 | Status: DISCONTINUED | OUTPATIENT
Start: 2022-03-07 | End: 2022-03-07

## 2022-03-07 RX ORDER — SODIUM CHLORIDE 9 MG/ML
3 INJECTION INTRAMUSCULAR; INTRAVENOUS; SUBCUTANEOUS EVERY 8 HOURS
Refills: 0 | Status: DISCONTINUED | OUTPATIENT
Start: 2022-03-07 | End: 2022-03-09

## 2022-03-07 RX ORDER — OXYCODONE HYDROCHLORIDE 5 MG/1
5 TABLET ORAL
Refills: 0 | Status: DISCONTINUED | OUTPATIENT
Start: 2022-03-07 | End: 2022-03-09

## 2022-03-07 RX ORDER — DIPHENHYDRAMINE HCL 50 MG
25 CAPSULE ORAL EVERY 6 HOURS
Refills: 0 | Status: DISCONTINUED | OUTPATIENT
Start: 2022-03-07 | End: 2022-03-09

## 2022-03-07 RX ORDER — OXYTOCIN 10 UNIT/ML
333.33 VIAL (ML) INJECTION
Qty: 20 | Refills: 0 | Status: DISCONTINUED | OUTPATIENT
Start: 2022-03-07 | End: 2022-03-09

## 2022-03-07 RX ORDER — SODIUM CHLORIDE 9 MG/ML
1000 INJECTION, SOLUTION INTRAVENOUS
Refills: 0 | Status: DISCONTINUED | OUTPATIENT
Start: 2022-03-07 | End: 2022-03-07

## 2022-03-07 RX ORDER — DIBUCAINE 1 %
1 OINTMENT (GRAM) RECTAL EVERY 6 HOURS
Refills: 0 | Status: DISCONTINUED | OUTPATIENT
Start: 2022-03-07 | End: 2022-03-09

## 2022-03-07 RX ORDER — MAGNESIUM HYDROXIDE 400 MG/1
30 TABLET, CHEWABLE ORAL
Refills: 0 | Status: DISCONTINUED | OUTPATIENT
Start: 2022-03-07 | End: 2022-03-09

## 2022-03-07 RX ORDER — CITRIC ACID/SODIUM CITRATE 300-500 MG
30 SOLUTION, ORAL ORAL ONCE
Refills: 0 | Status: DISCONTINUED | OUTPATIENT
Start: 2022-03-07 | End: 2022-03-07

## 2022-03-07 RX ORDER — BENZOCAINE 10 %
1 GEL (GRAM) MUCOUS MEMBRANE EVERY 6 HOURS
Refills: 0 | Status: DISCONTINUED | OUTPATIENT
Start: 2022-03-07 | End: 2022-03-09

## 2022-03-07 RX ORDER — OXYCODONE HYDROCHLORIDE 5 MG/1
5 TABLET ORAL ONCE
Refills: 0 | Status: DISCONTINUED | OUTPATIENT
Start: 2022-03-07 | End: 2022-03-09

## 2022-03-07 RX ORDER — PRAMOXINE HYDROCHLORIDE 150 MG/15G
1 AEROSOL, FOAM RECTAL EVERY 4 HOURS
Refills: 0 | Status: DISCONTINUED | OUTPATIENT
Start: 2022-03-07 | End: 2022-03-09

## 2022-03-07 RX ORDER — KETOROLAC TROMETHAMINE 30 MG/ML
30 SYRINGE (ML) INJECTION ONCE
Refills: 0 | Status: DISCONTINUED | OUTPATIENT
Start: 2022-03-07 | End: 2022-03-07

## 2022-03-07 RX ORDER — ACETAMINOPHEN 500 MG
975 TABLET ORAL
Refills: 0 | Status: DISCONTINUED | OUTPATIENT
Start: 2022-03-07 | End: 2022-03-09

## 2022-03-07 RX ADMIN — Medication 30 MILLIGRAM(S): at 23:56

## 2022-03-07 RX ADMIN — Medication 216 GRAM(S): at 19:00

## 2022-03-07 RX ADMIN — Medication 1000 MILLIUNIT(S)/MIN: at 23:00

## 2022-03-07 NOTE — OB PROVIDER H&P - NSOBVTERISKREFER_OBGYN_ALL_OB
Chart reviewed. Last Colonoscopy on 1/18/2016 with Dr. Quinn. Recommend repeat  5 years.     Please call pt to schedule Colonoscopy with Dr. Quinn.      Schedule Procedure:   Please Schedule Routine (next available or patient preference)    Procedure: Colonoscopy (07170) with MD preference for bowel prep. *No Suprep - No recent labs to determine current kidney function*    Diagnosis: Family History Colon Cancer Z80.0 and History of Colon Polyps Z86.010     Is patient:  · Diabetic? No  · ANTIPLATELET / ANTICOAGULATION: None    Latex allergy: No     Sleep apnea: No     Location: Patient Preference    Special Instructions:   MAC Anesthesia    Covid Vaccine: completed  Patient is NOT immunocompromised   COVID 19 Testing Ordered       Refer to the Assessment tab to view/cancel completed assessment.

## 2022-03-07 NOTE — OB PROVIDER DELIVERY SUMMARY - NSPROVIDERDELIVERYNOTE_OBGYN_ALL_OB_FT
21y  presenting after PPROM.  Patient felt rectal pressure and was found to be fully dilated, 0 station. She pushed effectively for 30 minutes, and delivered a viable male infant at 2257. Vertex delivered without difficulty, shoulders then delivered without difficulty. Placenta delivered spontaneously and intact at 2300. Pitocin started. Excellent hemostasis was achieved. Uterus, cervix, perineum and vagina were inspected and no lacerations were noted. Apgars 9,9. QBL 42.

## 2022-03-07 NOTE — OB PROVIDER H&P - HISTORY OF PRESENT ILLNESS
22yo  @ 35w1d by LMP 21 GEMMA 4/10/22 presents due to feeling sudden gush of clear fluid at 1652. Endorses contractions q8-10m. Otherwise no vaginal bleeding. Reports good fetal movement.    Pregnancy complicated by shortened cervix on vaginal progesterone, s/p betamethasone x2 on 3/4-5 and chlamydia infection in 2021 s/p antibiotics for treatment.    Obhx: SAB 12w, no D&C  Medhx: none  Surghx: knee surgery  Meds: PNVs  All: NKDA

## 2022-03-07 NOTE — CONSULT NOTE PEDS - ASSESSMENT
Jose is in  labor with  premature spontaneous rupture of membranes @ 35 1/7 weeks of gestation. Her prenatal labs will sent from L&D. She is having active contraction every 8-10 minutes. She is pregnant with  baby boy and is going to name him Sonido. We discussed about the common expectation associated with  delivery. There is slightly high chances for need of resuscitation at birth at  delivery compared to term delivery. The risk of RDS and different treatments. Increased risk for  hypoglycemia, hypothermia, hyperbilirubinemia. We also discussed about that parents can come to see their baby any time no restriction in visitation timings. Mom wants to exclusively breast feed and she was highly encouraged on that. All her questions were answered and was reminded to ask for neonatologist if she has any further questions.

## 2022-03-07 NOTE — OB PROVIDER H&P - ASSESSMENT
22yo  @ 35w1d by LMP admitted with  rupture of membranes. Cat 1 tracing. VSS.   s/p betamethasone as of 3/5    Plan:  Admit to OBGYN  Admission labs, COVID19 PCR  GBS, Vaginitis, and GC/CT panels sent  Continuous FHT + [place]  GBS Unknown: penicillin  s/p Betamethasone for fetal lung maturity  NICU consult  Epidural  Consider augmentation with Pitocin    d/w Dr. Baxter

## 2022-03-07 NOTE — OB PROVIDER H&P - ATTENDING COMMENTS
20yo  @ 35w1d by LMP admitted with  rupture of membrane, steroid complete. Reassuring FHT, augment as needed.

## 2022-03-07 NOTE — CONSULT NOTE PEDS - SUBJECTIVE AND OBJECTIVE BOX
Reason for consult: 35 weeks of gestation with rupture of membranes in labor    Maternal History: Jose is 21 years old A1 mom in  labor at 35  1/7 weeks of gestation . She had rupture of membranes today evening at 16:52 hrs and has been appreciating co traction regularly. Cat 1 tracing.  History of incompetabnt cervix present was on progesterone supplementation. s/p betamethasone as of 3/5. Fetal ultrasound done at 32 weeks of gestation unremarkable. No prenatal labs available. Chlamydia + 2021 - S/P antibiotics, test of cure not available. Blood group A+ ab -ve.       PAST MEDICAL & SURGICAL HISTORY:  Miscarriage within last 12 months    Social history ; former smoker - cigarettes quit date : 2021    Meds - PNV

## 2022-03-07 NOTE — OB PROVIDER H&P - NSHPPHYSICALEXAM_GEN_ALL_CORE
ICU Vital Signs Last 24 Hrs  HR: 68 (07 Mar 2022 17:53) (68 - 68)  BP: 130/76 (07 Mar 2022 17:53) (130/76 - 130/76)    FHT: Cat 1  [place]: q5-7m  SSE: large amount of pooling, grossly ruptured, no lesions visualized, no vaginal bleeding  SVE: 3/90/-2

## 2022-03-07 NOTE — OB PROVIDER H&P - CURRENT PREGNANCY COMPLICATIONS, OB PROFILE
Incompetent Cervix/Cervical Insufficiency/ Premature Rupture of Membranes (PPROM)/Maternal Unknown GBS

## 2022-03-07 NOTE — OB RN DELIVERY SUMMARY - NSSELHIDDEN_OBGYN_ALL_OB_FT
[NS_DeliveryAttending1_OBGYN_ALL_OB_FT:[id number]=],[NS_DeliveryAssist1_OBGYN_ALL_OB_FT:[id number]=],[NS_DeliveryRN_OBGYN_ALL_OB_FT:[id number]==]

## 2022-03-07 NOTE — OB RN PATIENT PROFILE - NS_CIRCUMCISIONPLAN_OBGYN_ALL_OB
RENAL PANEL   Order: [id number]   Status:  Final result   Visible to patient:  Yes (Suman) Dx:  Medication management   Notes recorded by Robb Brownlee MD on 5/3/2018 at 9:17 PM CDT  Let patient know tests show mildly increased creatinine we need to review medications      Ref Range & Units 1d ago  (5/3/18) 6mo ago  (10/19/17) 6mo ago  (10/9/17)    Fasting Status hrs 0  12      BUN 6 - 20 mg/dL 24   20  19R     Creatinine 0.51 - 0.95 mg/dL 1.54   1.38   1.2R     GFR Estimate,   38  43CM     Comments: eGFR 30-59 mL/min/1.73m2 = Moderate decrease in kidney function. Stage 3 CKD (chronic kidney disease) or moderate kidney disease.    GFR Estimate, Non   33  37CM     Comments: eGFR 30-59 mL/min/1.73m2 = Moderate decrease in kidney function. Stage 3 CKD (chronic kidney disease) or moderate kidney disease.    Sodium 135 - 145 mmol/L 137  143  141R     Potassium 3.4 - 5.1 mmol/L 4.6  4.4  4.2R     Chloride 98 - 107 mmol/L 106  106  102R     Carbon Dioxide 21 - 32 mmol/L 27  29  28     CALCIUM 8.4 - 10.2 mg/dL 8.7  9.2  8.9R     PHOSPHORUS 2.4 - 4.7 mg/dL 4.2       Albumin 3.6 - 5.1 g/dL 3.4    3.6R     Glucose 65 - 99 mg/dL 82  76  93R     Anion Gap 10 - 20 mmol/L 9   12  11.0R     BUN/Creatinine Ratio 7 - 25 16  14  15.8R               Yes

## 2022-03-07 NOTE — OB PROVIDER DELIVERY SUMMARY - NSSELHIDDEN_OBGYN_ALL_OB_FT
[NS_DeliveryAttending1_OBGYN_ALL_OB_FT:[id number]=],[NS_DeliveryAssist1_OBGYN_ALL_OB_FT:[id number]=],[NS_DeliveryRN_OBGYN_ALL_OB_FT:[id number]==]

## 2022-03-07 NOTE — OB RN DELIVERY SUMMARY - NS_SEPSISRSKCALC_OBGYN_ALL_OB_FT
EOS calculated successfully. EOS Risk Factor: 0.5/1000 live births ([facility] national incidence); GA=34w6d; Temp=98.6; ROM=6.083; GBS='Unknown'; Antibiotics='Broad spectrum antibiotics 2-3.9 hrs prior to birth'

## 2022-03-08 ENCOUNTER — RESULT REVIEW (OUTPATIENT)
Age: 22
End: 2022-03-08

## 2022-03-08 LAB
C TRACH RRNA SPEC QL NAA+PROBE: SIGNIFICANT CHANGE UP
COVID-19 SPIKE DOMAIN AB INTERP: POSITIVE
COVID-19 SPIKE DOMAIN AB INTERP: POSITIVE
COVID-19 SPIKE DOMAIN ANTIBODY RESULT: >250 U/ML — HIGH
COVID-19 SPIKE DOMAIN ANTIBODY RESULT: >250 U/ML — HIGH
HBV SURFACE AG SERPL QL IA: SIGNIFICANT CHANGE UP
HCT VFR BLD CALC: 32.7 % — LOW (ref 34.5–45)
HCT VFR BLD CALC: 32.9 % — LOW (ref 34.5–45)
HGB BLD-MCNC: 10.9 G/DL — LOW (ref 11.5–15.5)
HIV 1+2 AB+HIV1 P24 AG SERPL QL IA: SIGNIFICANT CHANGE UP
MEV IGG SER-ACNC: 5.7 AU/ML — SIGNIFICANT CHANGE UP
MEV IGG+IGM SER-IMP: NEGATIVE — SIGNIFICANT CHANGE UP
N GONORRHOEA RRNA SPEC QL NAA+PROBE: SIGNIFICANT CHANGE UP
RUBV IGG SER-ACNC: 1.5 INDEX — SIGNIFICANT CHANGE UP
RUBV IGG SER-IMP: POSITIVE — SIGNIFICANT CHANGE UP
SARS-COV-2 IGG+IGM SERPL QL IA: >250 U/ML — HIGH
SARS-COV-2 IGG+IGM SERPL QL IA: >250 U/ML — HIGH
SARS-COV-2 IGG+IGM SERPL QL IA: POSITIVE
SARS-COV-2 IGG+IGM SERPL QL IA: POSITIVE
SPECIMEN SOURCE: SIGNIFICANT CHANGE UP
T PALLIDUM AB TITR SER: NEGATIVE — SIGNIFICANT CHANGE UP

## 2022-03-08 PROCEDURE — 88307 TISSUE EXAM BY PATHOLOGIST: CPT | Mod: 26

## 2022-03-08 RX ORDER — ACETAMINOPHEN 500 MG
3 TABLET ORAL
Qty: 36 | Refills: 0
Start: 2022-03-08 | End: 2022-03-10

## 2022-03-08 RX ORDER — IBUPROFEN 200 MG
600 TABLET ORAL EVERY 6 HOURS
Refills: 0 | Status: DISCONTINUED | OUTPATIENT
Start: 2022-03-08 | End: 2022-03-09

## 2022-03-08 RX ORDER — IBUPROFEN 200 MG
1 TABLET ORAL
Qty: 28 | Refills: 0
Start: 2022-03-08 | End: 2022-03-14

## 2022-03-08 RX ADMIN — Medication 600 MILLIGRAM(S): at 23:06

## 2022-03-08 RX ADMIN — Medication 1 TABLET(S): at 13:03

## 2022-03-08 RX ADMIN — Medication 975 MILLIGRAM(S): at 15:25

## 2022-03-08 RX ADMIN — Medication 600 MILLIGRAM(S): at 18:20

## 2022-03-08 NOTE — DISCHARGE NOTE OB - PATIENT PORTAL LINK FT
You can access the FollowMyHealth Patient Portal offered by [facility] by registering at the following website: [email or web address]. By joining [employer]’s FollowMyHealth portal, you will also be able to view your health information using other applications (apps) compatible with our system.

## 2022-03-08 NOTE — PROGRESS NOTE ADULT - ASSESSMENT
21y  s/p  PPD #1 of a viable male infant.   Post-partum H&H pending this AM. VSS.    Plan:  - Baby in NICU  - Routine post-partum care.  - Encourage ambulation - if pt is not ambulating please use SCDs for DVT ppx.  - Regular diet.  - Encourage mother-baby interaction.  - Plan for discharge on post-partum day 2

## 2022-03-08 NOTE — PROGRESS NOTE ADULT - SUBJECTIVE AND OBJECTIVE BOX
Name: BETZY GARCÍA  MRN: [id number]  Date Admitted: 22  Location: [facility] 2020 ([facility] 2E)  Attending: Laura Baxter Brigid Rojas, Veronica    All: No Known Allergies    Post Partum: Vaginal Delivery Progress Note    BETZY GARCÍA is a 21y  s/p  PPD #1 of a viable male infant.     SUBJECTIVE:  No acute events overnight. Pain is well controlled with PRN pain medication. No problems with ambulating, voiding, or PO intake. Has had flatus but no BM. Denies N/V. Patient is having normal lochia which is decreasing.    She is breastfeeding and the baby is latching on.     OBJECTIVE:  Physical exam:  General: AOx3, NAD.  Heart: RRR. S1S2.  Lungs: CTABL. Good airflow bilaterally.   Abdomen: +BS, Soft, appropriately tender to palpitation, firm uterine fundus at umbilicus.  Ext: No calf tenderness bilaterally, Mary's sign negative, warm extremities.    Vital Signs Last 24 Hrs  T(C): 36.8 (08 Mar 2022 04:09), Max: 37.0 (07 Mar 2022 20:30)  T(F): 98.3 (08 Mar 2022 04:09), Max: 98.6 (07 Mar 2022 20:30)  HR: 95 (08 Mar 2022 04:09) (64 - 237)  BP: 116/70 (08 Mar 2022 04:09) (109/51 - 145/85)  RR: 18 (08 Mar 2022 04:09) (17 - 20)  SpO2: 97% (08 Mar 2022 04:09) (79% - 100%)    LABS:                      11.4   19.72 )-----------( 265      ( 07 Mar 2022 19:10 )             34.2

## 2022-03-08 NOTE — DISCHARGE NOTE OB - NS MD DC FALL RISK RISK
For information on Fall & Injury Prevention, visit: [email or web address] OR  [email or web address] OR  https://www.cdc.gov/steadi/patient.html

## 2022-03-09 VITALS
OXYGEN SATURATION: 95 % | RESPIRATION RATE: 18 BRPM | DIASTOLIC BLOOD PRESSURE: 67 MMHG | TEMPERATURE: 98 F | HEART RATE: 78 BPM | SYSTOLIC BLOOD PRESSURE: 107 MMHG

## 2022-03-09 LAB
CANDIDA AB TITR SER: SIGNIFICANT CHANGE UP
CULTURE RESULTS: SIGNIFICANT CHANGE UP
G VAGINALIS DNA SPEC QL NAA+PROBE: SIGNIFICANT CHANGE UP
SPECIMEN SOURCE: SIGNIFICANT CHANGE UP
T VAGINALIS SPEC QL WET PREP: SIGNIFICANT CHANGE UP

## 2022-03-09 PROCEDURE — 90715 TDAP VACCINE 7 YRS/> IM: CPT

## 2022-03-09 PROCEDURE — 90707 MMR VACCINE SC: CPT

## 2022-03-09 PROCEDURE — 86703 HIV-1/HIV-2 1 RESULT ANTBDY: CPT

## 2022-03-09 PROCEDURE — 88307 TISSUE EXAM BY PATHOLOGIST: CPT

## 2022-03-09 PROCEDURE — 86762 RUBELLA ANTIBODY: CPT

## 2022-03-09 PROCEDURE — 86769 SARS-COV-2 COVID-19 ANTIBODY: CPT

## 2022-03-09 PROCEDURE — 85014 HEMATOCRIT: CPT

## 2022-03-09 PROCEDURE — 86900 BLOOD TYPING SEROLOGIC ABO: CPT

## 2022-03-09 PROCEDURE — 87491 CHLMYD TRACH DNA AMP PROBE: CPT

## 2022-03-09 PROCEDURE — U0005: CPT

## 2022-03-09 PROCEDURE — 87591 N.GONORRHOEAE DNA AMP PROB: CPT

## 2022-03-09 PROCEDURE — 59025 FETAL NON-STRESS TEST: CPT

## 2022-03-09 PROCEDURE — 36415 COLL VENOUS BLD VENIPUNCTURE: CPT

## 2022-03-09 PROCEDURE — 87389 HIV-1 AG W/HIV-1&-2 AB AG IA: CPT

## 2022-03-09 PROCEDURE — G0463: CPT

## 2022-03-09 PROCEDURE — 86765 RUBEOLA ANTIBODY: CPT

## 2022-03-09 PROCEDURE — 87800 DETECT AGNT MULT DNA DIREC: CPT

## 2022-03-09 PROCEDURE — 87340 HEPATITIS B SURFACE AG IA: CPT

## 2022-03-09 PROCEDURE — U0003: CPT

## 2022-03-09 PROCEDURE — 86780 TREPONEMA PALLIDUM: CPT

## 2022-03-09 PROCEDURE — 86850 RBC ANTIBODY SCREEN: CPT

## 2022-03-09 PROCEDURE — 85025 COMPLETE CBC W/AUTO DIFF WBC: CPT

## 2022-03-09 PROCEDURE — 86901 BLOOD TYPING SEROLOGIC RH(D): CPT

## 2022-03-09 PROCEDURE — 85018 HEMOGLOBIN: CPT

## 2022-03-09 PROCEDURE — 87081 CULTURE SCREEN ONLY: CPT

## 2022-03-09 PROCEDURE — 59050 FETAL MONITOR W/REPORT: CPT

## 2022-03-09 RX ORDER — TETANUS TOXOID, REDUCED DIPHTHERIA TOXOID AND ACELLULAR PERTUSSIS VACCINE, ADSORBED 5; 2.5; 8; 8; 2.5 [IU]/.5ML; [IU]/.5ML; UG/.5ML; UG/.5ML; UG/.5ML
0.5 SUSPENSION INTRAMUSCULAR ONCE
Refills: 0 | Status: COMPLETED | OUTPATIENT
Start: 2022-03-09 | End: 2022-03-09

## 2022-03-09 RX ADMIN — Medication 1 TABLET(S): at 12:13

## 2022-03-09 RX ADMIN — Medication 975 MILLIGRAM(S): at 11:30

## 2022-03-09 RX ADMIN — Medication 975 MILLIGRAM(S): at 10:40

## 2022-03-09 RX ADMIN — Medication 975 MILLIGRAM(S): at 02:01

## 2022-03-09 RX ADMIN — Medication 600 MILLIGRAM(S): at 13:42

## 2022-03-09 RX ADMIN — TETANUS TOXOID, REDUCED DIPHTHERIA TOXOID AND ACELLULAR PERTUSSIS VACCINE, ADSORBED 0.5 MILLILITER(S): 5; 2.5; 8; 8; 2.5 SUSPENSION INTRAMUSCULAR at 16:21

## 2022-03-09 RX ADMIN — Medication 975 MILLIGRAM(S): at 16:27

## 2022-03-09 RX ADMIN — Medication 600 MILLIGRAM(S): at 14:30

## 2022-03-09 RX ADMIN — Medication 0.5 MILLILITER(S): at 16:55

## 2022-03-09 RX ADMIN — Medication 600 MILLIGRAM(S): at 05:53

## 2022-03-09 RX ADMIN — Medication 975 MILLIGRAM(S): at 17:02

## 2022-03-09 NOTE — PROGRESS NOTE ADULT - ASSESSMENT
A/P: BETZY GARCÍA is a 21y  now PPD#1 s/p spontaneous vaginal delivery at 35w1d gestation, uncomplicated.  -Vital signs stable  -Hgb: 11.4 > 10.9   -Voiding, tolerating PO  -Advance care as tolerated   -Continue routine postpartum care and education  -Healthy male infant, desires circumcision  -Dispo: Anticipate discharge to home pending attending approval.

## 2022-03-09 NOTE — PROGRESS NOTE ADULT - SUBJECTIVE AND OBJECTIVE BOX
BETZY GARCÍA is a 21y  now PPD#1 s/p spontaneous vaginal delivery at 35w1d gestation, uncomplicated.    S:    No acute events overnight.   The patient has no complaints.  Pain controlled with current treatment regimen.   She is ambulating without difficulty and tolerating PO.   + flatus/-BM/+ voiding   She endorses appropriate lochia, which is decreasing.   She is breastfeeding without difficulty.   She denies fevers, chills, nausea and vomiting.   She denies lightheadedness, dizziness, palpitations, chest pain and SOB.     O:    T(C): 36.9 (22 @ 15:30), Max: 36.9 (22 @ 15:30)  HR: 97 (22 @ 15:30) (97 - 97)  BP: 128/85 (22 @ 15:30) (128/85 - 128/85)  RR: 18 (22 @ 15:30) (18 - 18)  SpO2: 98% (22 @ 15:30) (98% - 98%)    Gen: NAD, AOx3  CV: RRR, S1/S2 present  Pulm: CTAB  Abdomen:  Soft, non-tender, non-distended, +bowel sounds  Uterus:  Fundus firm below umbilicus  VE:  Expected lochia  Ext:  b/l LE non-tender                           10.9   x     )-----------( x        ( 08 Mar 2022 08:13 )             32.9                  BETZY GARCÍA is a 21y  now PPD#1 s/p spontaneous vaginal delivery at 35w1d gestation, uncomplicated.    S:    No acute events overnight.   The patient has no complaints.  Pain controlled with current treatment regimen.   She is ambulating without difficulty and tolerating PO.   + flatus/-BM/+ voiding   She endorses appropriate lochia, which is decreasing.   She is pumping.  She denies fevers, chills, nausea and vomiting.   She denies lightheadedness, dizziness, palpitations, chest pain and SOB.     O:    T(C): 36.9 (22 @ 15:30), Max: 36.9 (22 @ 15:30)  HR: 97 (22 @ 15:30) (97 - 97)  BP: 128/85 (22 @ 15:30) (128/85 - 128/85)  RR: 18 (22 @ 15:30) (18 - 18)  SpO2: 98% (22 @ 15:30) (98% - 98%)    Gen: NAD, AOx3  CV: RRR, S1/S2 present  Pulm: CTAB  Abdomen:  Soft, non-tender, non-distended, +bowel sounds  Uterus:  Fundus firm below umbilicus  VE:  Expected lochia  Ext:  b/l LE non-tender                           10.9   x     )-----------( x        ( 08 Mar 2022 08:13 )             32.9

## 2022-03-21 LAB — SURGICAL PATHOLOGY STUDY: SIGNIFICANT CHANGE UP

## 2023-04-19 NOTE — OB NEONATOLOGY/PEDIATRICIAN DELIVERY SUMMARY - BABY A: APGAR 5 MIN HEART RATE, DELIVERY
(2) more than 100 beats/min Birth Control Pills Pregnancy And Lactation Text: This medication should be avoided if pregnant and for the first 30 days post-partum.

## 2023-10-13 NOTE — OB RN TRIAGE NOTE - NS_PRENATALLOC_OBGYN_ALL_OB
Expected Date Of Service: 10/13/2023 Billing Type: Third-Party Bill Performing Laboratory: -[phone] Lab Facility: 0 Bill For Surgical Tray: no MD Office

## 2024-02-13 NOTE — OB RN PATIENT PROFILE - NSWEIGHTCALCTOOLDRUG_GEN_A_CORE
02/13/2024   [facility]  N/A  For questions about this resource list or additional care needs, please contact your primary care clinic or care manager.  Phone: [phone]   Email: N/A   Address: [address] [place] [address]   Hours: N/A        Hotlines and Helplines       Hotline - Housing crisis  1  [facility] Distance: 8.25 miles      Phone/Virtual   [address] [place] [address]  Language: English  Hours: Mon - Sun Open 24 Hours   Phone: (615) 715-9006 Email: communications@[facility].org Website: https://[facility]-[place].org/oursaviourshousing/     2  [facility] Distance: 8.82 miles      Phone/Virtual   [address] [place] [address]  Language: English  Hours: Mon - Sun Open 24 Hours   Phone: (225) 730-8013 Email: info@[facility] Website: http://www.[facility].org          Housing       Coordinated Entry access point  3  [facility]  Office - [facility] Distance: 3.84 miles      Phone/Virtual   [address] [address]  Language: English  Hours: Mon - Fri 8:30 AM - 12:00 PM , Mon - Fri 1:00 PM - 4:00 PM  Fees: Free   Phone: (974) 307-7747 Ext.2 Email: shannon@[facility].[employer].org Website: https://www.[employer].org/usn/     4  [facility] ([facility] [facility] - Housing Services Distance: 8.39 miles      In-Person   [address] [place] [address]  Language: English  Hours: Mon - Fri 9:00 AM - 5:00 PM  Fees: Free   Phone: (726) 786-8952 Email: housing@[facility].org Website: http://www.[facility].org/housing     Drop-in center or day shelter  5  [facility] Distance: 6.99 miles      In-Person   [address] [place] [address]  Language: English, Hmong, Peruvian, Amharic  Hours: Mon - Thu 8:30 AM - 4:30 PM , Sat - Sun 9:00 AM - 12:00 PM  Fees: Free   Phone: (407) 835-2621 Email: info@[employer].org Website:  https://sharingNov[facility] Presbyterian [facility] Centercaringhands.org/     6  [facility] Distance: 7.83 miles      In-Person   [address] [place] [address]  Language: English, Indian, Citizen of [place]  Hours: Mon - Sat 7:00 AM - 3:00 PM  Fees: Free, Self Pay   Phone: (817) 553-1600 Email: info@[employer].com Website: https://www.[employer].mnlakeplace.com/locations/opportunity-center/     Housing search assistance  7  [employer] [facility] [employer] [facility] [facility] Distance: 3.72 miles      Phone/Virtual   [address] [place] [address]  Language: English, Citizen of [place]  Hours: Mon - Fri 9:00 AM - 5:00 PM  Fees: Free   Phone: (242) 322-1338 Email: services@[employer] [facility] [employer] Website: https://www.[employer]     8  [facility] ([facility] - [facility] Distance: 3.85 miles      In-Person, Phone/Virtual   [address] [address]  Language: English  Hours: Mon - Fri 8:00 AM - 4:30 PM  Fees: Free   Phone: (950) 678-3784 Email: accap@[facility].org Website: http://www.accap.org     Shelter for families  9  [facility] Distance: 12.84 miles      In-Person   [address] [place] [address]  Language: English  Hours: Mon - Fri 3:00 PM - 9:00 AM , Sat - Sun Open 24 Hours  Fees: Free   Phone: (876) 324-5245 Ext.1 Website: https://www.[facility].org/2020/07/03/emergency-family-shelter/     Shelter for individuals  10  [facility] Distance: 7.27 miles      In-Person   [address] [place] [address]  Language: English  Hours: Mon - Fri 4:00 PM - 9:00 AM  Fees: Free   Phone: (374) 671-6389 Email: brennan@[facility].org Website: https://centralusa.[facility].org/northern/[facility]/     11  [facility] Distance: 8.25 miles      In-Person   [address] [place] [address]  Language: English  Hours: Mon - Sun Open 24 Hours  Fees: Free   Phone: (146)  924-7708 Email: communications@[facility].org Website: https://[facility]-[place].org/oursaviourshousing/          Important Numbers & Websites       Emergency Services   911  [facility] Services   311  Poison Control   (407) 423-8204  Suicide Prevention Lifeline   (669) 566-1710 (TALK)  Child Abuse Hotline   (190) 664-2535 (4-A-Child)  Sexual Assault Hotline   (204) 610-9434 (HOPE)  National Runaway Safeline   (922) 355-2052 (RUNAWAY)  All-Options Talkline   (520) 132-7205  Substance Abuse Referral   (548) 936-7247 (HELP)      used

## 2024-08-21 NOTE — OB NEONATOLOGY/PEDIATRICIAN DELIVERY SUMMARY - NSHXCHECK_OBGYN_ALL_OB
Pulmonary/CC Medicine     Patient was seen and examined with my APN   Agree with above outlined AP (note to follow)   At least 50% of time was spent in direct patient care   I have reviewed labs and imaging at length   Plan of care dw team     CCT 32 exclusive of teaching and procedures     Frequent needs for full vent support   As no reserve to maintain off vent for a while   Now on HD   Making urine still   On feeding     Needs LTACH down the line for slower weaning attempts     Latest CXR mild bibasilar atelectasis     On abx per ID     Renal following - remains on immunosuppressive therapies     Doo we consider stopping immunosuppressive therapies and deem the renal TL a failure and comitt him to HD, perhaps he can recover from his infections in that scenario     Afia Koenig MD  Pulmonary/CC Medicine    10:15 AM  08/21/24     Yes

## 2025-06-02 NOTE — OB RN TRIAGE NOTE - SUICIDE SCREENING QUESTION 3
Primary Care Physician: NAYELI Yadav-CNP      Date of Visit: 06/02/2025  8:40 AM EDT  Location of visit: [facility]   Type of Visit: Established Patient     HPI / Summary:   Patient is a 45year old woman with HTN, hyperlipidemia ( at FH levels) with strong family history of CAD , with morbid obesity, h/o asthma, GUICHO ( using CPAP) and MBL with statin intolerance who had a syncopal episode( in November 2020, still with significant issues with joint pain and uncontrolled HTN also s/p COVID infection in late Dec 2021 who returns for followup.     Patient with significant issues with pain from MBL who has had issues controlling her BP when pain is worst. BP meds have been adjusted over time. The higher BP in the afternoon did correlate when her pain is worse. Continues to work with pain management     Cardiac work up :  ST scan for CAC: total 0  Cardiac Echo Jan 23 2020: normal LV size and systolic function with LVEF 65-70% technically difficult study. impaired relaxation, no significant valvular pathology.  Event monitor from 11/19/2020-12/19/2020: min HR 70 bpm, max 132, no SVEs VEs noted. skipped beats, lightheadedness and heart racing corresponded to sinus rhythm and sinus tachycardia.   CTA with heart flow 3/18/2020: normal chamber sizes, normal coronary anatomy without evidence for atherosclerotic changes or stenotic disease.   Renal artery ultrasound 1/23/2020- no evidence for renal artery stenosis. bilateral renal veins widely patent.  VMA and metanephrines were normal, TSH was normal.      She has had syncopal episodes over the years.  She wore a 30 day( Nov 2020) monitor and log notes occasional skipped beats, some SOB and CP and heart racing. Monitor during those episodes showed sinus rhythm and sinus tachycardia, and did not show any worrisome arrhythmias. Had not had syncopal episodes from Nov 2020 til at some point in  2022. She recently was diagnosed with POTs ( ? Testing) and was working  with a neurologist. Most recently had a syncopal episodes without any warning Oct 2023. ( Holding grandchild, fell and broke a table in child's room). More recently she does note an increase in her palpitations. Heart races for seconds then stops. Had tried to wear 30 day live monitor but had significant skin reaction to any leads( even sensitive leads) so unable to wear for any extended period of time. No recurrent syncopal episodes since that time. ( If any further events will need a loop recorder)   She previously was using  CPAP for GUICHO, but has not been able to tolerate following COVID. She still needs to return to sleep medicine.  Has hyperlipidemia at FH levels and is statin intolerant and had been on  repatha . Also has elevated triglycerides and was on over the counter fish oil, but could not tolerate due to reflux issues and vomiting. She had tolerated vascepa in the past though could not afford so d/cd.      She saw Dr Gentile for medical weight. On meds ( monjaro) she had initially lost 55 lbs, however insurance stopped paying for meds and could not afford . Off meds she has regained 35 lbs. She does no  exercise due to pain.  She intermittently has LE swelling. She uses support stockings. She denies CP or SOB at rest or with ADLs. IN the past she typically needed to use the additional dose of 0.1 mg clonidine 1-2 x per week. Additionally she  occasionally got dizzy and had low BP which she would treat with volume expanders( Gatorade etc) and uses support socks about 1-2 x per week.      She was on carvedilol but her neurologist who diagnosed her with POTs reportedly added metoprolol on top of that for POTs. That MD is no longer taking care of the pateint and she says she is in need of a new neurologist for POTs.   More recently her BP remains variable ranging from 102/50mmHg up to 180/110mmHg Typically 150/85-110mmHg. Her BP is higher than it has been since she can no longer tolrate CPAP so she is not  using it and her pain meds are being titrated down, so she has much more pain. She does have headaches when her BP is high. She is no longer taking wegovy or mounjaro and her weight has increased recently. In August prior to reductions in her pain meds, 24 hour BP monitor showed stage 1 HTN with borderline control. With abnormal nocturnal dipping. Systolic BP highest at 169mmHg and lowest 92 mmHg. Highest diatolic , and lowest 48mmH. AM average 137/82mmHg and /81mmHg. Pt feels her BP has increased since that time.        ROS: Full 10 pt review of symptoms of negative unless discussed above.     Problems:   Problem List[1]  Medical History:   Medical History[2]  Surgical Hx:   Surgical History[3]   Social Hx:   Tobacco Use: Low Risk  (4/17/2025)    Patient History     Smoking Tobacco Use: Never     Smokeless Tobacco Use: Never     Passive Exposure: Not on file     Alcohol Use: Not At Risk (8/26/2024)    AUDIT-C     Frequency of Alcohol Consumption: Never     Average Number of Drinks: Patient does not drink     Frequency of Binge Drinking: Never     Family Hx:   Family History[4]   Exam:   Vitals: There were no vitals filed for this visit.  Wt Readings from Last 5 Encounters:   04/22/25 102 kg (224 lb 1.6 oz)   04/17/25 102 kg (224 lb)   02/27/25 102 kg (224 lb)   02/10/25 102 kg (224 lb 9.6 oz)   12/04/24 104 kg (228 lb 2.8 oz)      Physical Exam  Labs:   Recent Labs     04/22/25  1326 04/18/25  1137 02/19/25  1250 11/30/24  1008 11/07/24  1106 09/26/24  1436 08/27/24  0533 08/26/24  0753   WBC 5.0 3.7* 4.4 4.5 3.8* 7.9 10.7 11.5*   HGB 13.2 11.7* 12.7 12.9 12.6 10.0* 10.4* 10.3*   HCT 40.4 35.6* 38.0 38.6 39.0 30.9* 33.0* 32.9*    263 280 265 308 242 341 312   MCV 92 90 94.1 89 92 91 94 97     Recent Labs     02/19/25  1251 11/05/24  1101 09/26/24  1436 08/27/24  0533 08/26/24  0753 08/25/24  0626 08/24/24  1333 07/07/24  1818    137 138 136 136 136 137 139   K 3.8 4.2 3.8 4.3 3.9 4.4 4.2  3.8    100 105 104 105 104 104 103   BUN 9 9 13 19 17 13 14 11   CREATININE 0.56 0.47* 0.59 0.57 0.54 0.62 0.61 0.63      Recent Labs     04/22/25  1326 04/18/25  1137 11/30/24  1008 09/26/24  1436 07/01/24  1244 02/21/23  1251 04/09/21  1204 11/19/20  1300 05/27/20  1242 06/20/19  1416   HGBA1C  --   --  4.8  --   --  4.6 5.0 5.0 5.2 5.1   FERRITIN 45 60 116 20 26  --   --  42  --   --    TIBC 449* 423 403 460* 444  --   --  486*  --   --    IRONSAT 27 26 32 16* 16*  --   --  25  --   --      Lab Results   Component Value Date    CHOL 212 (H) 04/17/2025    HDL 41 (L) 04/17/2025    LDLCALC  04/17/2025      Comment:         LDL cholesterol not calculated. Triglyceride levels  greater than 400 mg/dL invalidate calculated LDL results.           Reference range: <100     Desirable range <100 mg/dL for primary prevention;    <70 mg/dL for patients with CHD or diabetic patients   with > or = 2 CHD risk factors.     LDL-C is now calculated using the eHnrique-Sydnie   calculation, which is a validated novel method providing   better accuracy than the Friedewald equation in the   estimation of LDL-C.   Henrique SS et al. BON. 2013;310(19): 2693-2174   (http://education.[employer]/faq/LSS967)      TRIG 472 (H) 04/17/2025     Notable Studies: imaging personally reviewed and summarized by me below  EKG:  No results found. However, due to the size of the patient record, not all encounters were searched. Please check Results Review for a complete set of results.    Imaging:  === Results for orders placed in visit on 03/18/20 ===    CT ANGIO HEART CORONARY [[id number]] 03/18/2020    Status: Normal  1. Normal coronary anatomy without evidence of atherosclerotic  changes or stenotic disease.    I personally reviewed the images/study and I agree with the findings  as stated. This study was interpreted at [facility], [place], [place].     Echo 2/5/2024:  PHYSICIAN INTERPRETATION:  Left  Ventricle: The left ventricular systolic function is normal, with an estimated ejection fraction of 60-65%. There are no regional wall motion abnormalities. The left ventricular cavity size is normal. Spectral Doppler shows a normal pattern of left ventricular diastolic filling.  Left Atrium: The left atrium is normal in size.  Right Ventricle: The right ventricle is normal in size. There is normal right ventricular global systolic function.  Right Atrium: The right atrium is normal in size.  Aortic Valve: The aortic valve is trileaflet. There is no evidence of aortic valve regurgitation. The peak instantaneous gradient of the aortic valve is 3.4 mmHg. The mean gradient of the aortic valve is 2.0 mmHg.  Mitral Valve: The mitral valve is normal in structure. There is trace mitral valve regurgitation.  Tricuspid Valve: The tricuspid valve is structurally normal. There is trace tricuspid regurgitation. The right ventricular systolic pressure is unable to be estimated.  Pulmonic Valve: The pulmonic valve is structurally normal. There is physiologic pulmonic valve regurgitation.  Pericardium: There is a trivial pericardial effusion.  Aorta: The aortic root is normal.  Systemic Veins: The inferior vena cava appears to be of normal size.  In comparison to the previous echocardiogram(s): Compared with study [facility] 1/23/2020,. [facility], there are no significant changes though endocardial definition was suboptimal on today's exam and would have benefitted from use of echocontrast.        CONCLUSIONS:   1. Left ventricular systolic function is normal with a 60-65% estimated ejection fraction.   2. [facility], there are no significant changes though endocardial definition was suboptimal on today's exam and would have benefitted from use of echocontrast.        Cardiac stress test MRI 3/15/2022:   1. Normal LV size (EDVi 56 ml/m2) with mildly reduced systolic    function (LVEF 47 %) .Global hypokinesis  without focal wall motion abnormality  2. Questionable myocardial inflammation/edema on limited T2-weighted   imaging (T2 mapping). Would correlate  with biomarkers and clinical status for myocarditis.   3. No evidence of myocardial fibrosis, infiltration or infarction   based on late gadolinium imaging      LEFT VENTRICLE: 1. Normal LV size (EDVi 56 ml/m2) mildly reduced   systolic function (LVEF 47 %).   2. Global hypokinesis without focal wall motion abnormality  3. Normal LV wall thickness and indexed LV mass.   4. Normal ECV 25%.   5. Limited T2 mapping due to artifacts. Patchy elevated native T2   values (>55msec ms) , which can be seen  with myocarditis. Would correlate with biomarkers and clinical status   for myocarditis.   6. Following administration of gadolinium, in the early phase, there   is no evidence of LV thrombus or MVO.   7. In the late phase, there is no significant myocardial enhancement.  Quantitative LVEF 47 %.     VIABILITY: Hyperenhancement is normal.     RIGHT VENTRICLE: Quantitative RVEF 48 %.     LV/RV SEPTUM: The LV/RV septum is normal.     LA/RA SEPTUM: The LA/RA septum is normal.     LEFT ATRIUM: Severe left atrial     RIGHT ATRIUM: Mild right atrial enlargement     PERICARDIUM: Trivial pericardial effusion     PLEURAL EFFUSION: No pleural effusion     AORTIC VALVE: Peak aortic valve velocity 75 cm/sec. Aortic   regurgitant volume 3 ml. Aortic regurgitant fraction 8 %.     MITRAL VALVE: There is mild mitral regurgitation with regurgitant   volume 15ml, regurgitant fraction 30%.      AORTIC ROOT: The aortic root is normal.      Cardiac Monitor 3/2024:  no Afib, sinus rhythm with no significant arrhythmias ( but did not wear for a prolonged period of time)          Current Outpatient Medications   Medication Instructions    albuterol 2.5 mg /3 mL (0.083 %) nebulizer solution inhale the contents of one vial via nebulizer every 4 hours as needed     ALPRAZolam (XANAX) 1 mg, oral, Nightly PRN    [START ON 6/2/2025] ALPRAZolam (XANAX) 1 mg, oral, Nightly PRN    amLODIPine (NORVASC) 10 mg, oral, Daily    amoxicillin (AMOXIL) 500 mg, Daily    ARIPiprazole (ABILIFY) 5 mg, oral, Daily    ascorbic acid, vitamin C, 500 mg capsule 1 capsule, Daily    benzoyl peroxide (Benzac AC) 10 % external wash As needed    buPROPion XL (WELLBUTRIN XL) 150 mg, oral, Daily, Do not crush, chew, or split.    buPROPion XL (WELLBUTRIN XL) 300 mg, oral, Every morning, Do not crush, chew, or split.    carbidopa-levodopa-entacapone (Stalevo) -200 mg tablet 1 tablet, oral, Every 4 hours    cetirizine-pseudoephedrine (ZyrTEC-D) 5-120 mg 12 hr tablet 1 tablet, oral, 2 times daily    clindamycin (Cleocin T) 1 % lotion Apply topically once daily as needed.    clobetasol (Temovate) 0.05 % external solution 2 times daily    clobetasoL 0.05 % lotion One application as needed for flaring only.not for daily use. For scalp and hands only    cloNIDine (CATAPRES) 0.1 mg, As needed    cloNIDine (Catapres-TTS) 0.3 mg/24 hr patch Apply one patach on the skin and replace every 7 days, as directed    Cosentyx Pen, 2 Pens, 150 mg/mL self-injector pen Inject 2 pens (300 mg) under the skin every 30 (thirty) days.    cyanocobalamin (VITAMIN B-12) 1,000 mcg, intramuscular, See admin instructions    diclofenac sodium (VOLTAREN) 4 g, 2 times daily PRN    DULoxetine (CYMBALTA) 120 mg, oral, Daily, Do not crush or chew.    eplerenone (Inspra) 25 mg tablet 1 tablet, Daily    ezetimibe (ZETIA) 10 mg, oral, Daily    fluticasone furoate-vilanteroL (Breo Ellipta) 200-25 mcg/dose inhaler 1puff daily, Inhalation    ipratropium-albuteroL (Duo-Neb) 0.5-2.5 mg/3 mL nebulizer solution 3 mL, Every 4 hours PRN    lactobacillus acidophilus (Lactobacillus acidoph-L.bulgar) tablet tablet 1 tablet, oral, Daily    leflunomide (ARAVA) 10 mg, oral, Daily    levalbuterol (Xopenex) 1.25 mg/3 mL nebulizer solution 1 ampule, 3 times  "daily RT    meloxicam (MOBIC) 15 mg, oral, Daily    metoprolol succinate XL (TOPROL-XL) 50 mg, oral, Daily, Do not crush or chew. Two tablets 50 mg twice daily    metroNIDAZOLE (Metrocream) 0.75 % cream Apply topically once daily as needed.    montelukast (SINGULAIR) 10 mg, Nightly    naloxone (NARCAN) 4 mg, nasal, As needed, May repeat every 2-3 minutes if needed, alternating nostrils, until medical assistance becomes available.    nebulizer and compressor device use q4-6 hours with albuterol PRN cough/wheeze    nebulizers (Mobile AuthenticationlACE*COMM Disposable Nebulizer) misc Every 4 hours PRN    omega-3 acid ethyl esters (Lovaza) 1 gram capsule Take 2 capsules (2 grams) by mouth 2 times a day.    omeprazole (PRILOSEC) 40 mg, oral, Daily before breakfast, Do not crush or chew.    ondansetron ODT (ZOFRAN-ODT) 4 mg, oral, Every 8 hours PRN    orphenadrine (NORFLEX) 100 mg, oral, 2 times daily PRN    pregabalin (LYRICA) 75 mg, oral, 3 times daily    ProAir HFA 90 mcg/actuation inhaler Inhale 2 puffs every 4 hours if needed.    Repatha SureClick 140 mg, subcutaneous, Every 14 days    rizatriptan (MAXALT) 10 mg, oral, Once as needed, May repeat in 2 hours if unresolved. Do not exceed 30 mg in 24 hours.    Spiriva Respimat 1.25 mcg/actuation inhaler 2 puffs, Daily RT    syringe with needle, safety 3 mL 23 gauge x 1\" syringe 1 Box, miscellaneous, See admin instructions    Tezspire 210 mg    Wegovy 0.25 mg, subcutaneous, Weekly     Impressions and Plan:    #      Patient Instructions:  If you have any questions or need cardiac medication refills, please call my office at [phone],      To reach my office please call [phone]  To schedule an appointment call [phone].          ____________________________________________________________  Akosua Richardson MD  Division of Cardiovascular Medicine  [facility]        [1]   Patient Active Problem List  Diagnosis    Allergic " rhinitis    Anxiety disorder    Arthralgia of multiple sites    Bariatric surgery status    Benign essential hypertension    Bowel disease, inflammatory    Immunodeficiency syndrome (Multi)    Chronic diarrhea    Continuous LLQ abdominal pain    Depression    Essential familial hyperlipidemia    GERD (gastroesophageal reflux disease)    Gouty arthropathy    Hoarseness    Hot flashes    Hyperlipidemia    Hypertriglyceridemia    Hypothyroidism (acquired)    Inflammatory arthropathy    Rheumatoid arthritis    Hypersomnia    Labile hypertension    Low vitamin D level    Lyme disease    Mannose-binding lectin deficiency (Multi)    Methadone use    Migraine without status migrainosus, not intractable    Morbid obesity (Multi)    Mixed incontinence urge and stress    Osteoporosis    Obstructive sleep apnea, adult    DRD (DOPA-responsive dystonia)    Post herpetic neuralgia    Polyphagia    POTS (postural orthostatic tachycardia syndrome)    Prediabetes    Psoriatic arthropathy (Multi)    Steroid withdrawal syndrome with complication    Steroid-dependent asthma ([facility])    Vitamin B12 deficiency    Vitamin D deficiency    Gait disturbance    Bilateral leg edema    Cystocele with uterine descensus    Dyspnea on effort    Elbow tendinitis    Foreign body in ear, right, initial encounter    Frequent falls    Hyperglycemia    Hyperuricemia    Incomplete bladder emptying    Insomnia, organic    Intermittent palpitations    Lower back pain    Maxillary sinusitis    Myofascial pain    Orthostatic intolerance    Other ovarian cyst, unspecified side    Pelvic floor weakness    Perioral dermatitis    Peripheral neuropathy    Postural dizziness    Psoriasis vulgaris    Recurrent urinary tract infection    Renal lesion    Right lumbar radiculopathy    Rosacea, unspecified    Sacroiliac joint dysfunction of right side    Snoring    Spasm of thoracic back muscle    Syncope    Urinary frequency    Panic attacks    Facial swelling    Iron  deficiency anemia secondary to inadequate dietary iron intake    Idiopathic steatorrhea ([facility])   [2]   Past Medical History:  Diagnosis Date    Acute upper respiratory infection, unspecified 07/26/2016    Viral URI with cough    Acute upper respiratory infection, unspecified 11/15/2017    Viral URI with cough    Allergy status to unspecified drugs, medicaments and biological substances     History of allergy    Chronic maxillary sinusitis 08/27/2018    Maxillary sinusitis, unspecified chronicity    Dystonia, unspecified 04/08/2014    Dystonia    Encounter for other screening for malignant neoplasm of breast 01/22/2018    Breast screening    Encounter for screening for respiratory tuberculosis 11/11/2013    Screening examination for pulmonary tuberculosis    Essential (primary) hypertension 12/27/2017    Benign essential hypertension    Hypersomnia, unspecified 04/13/2015    Sleeps too much    Idiopathic sleep related nonobstructive alveolar hypoventilation     Nocturnal hypoxemia    Idiopathic sleep related nonobstructive alveolar hypoventilation 02/09/2018    Nocturnal hypoxia    Impaired fasting glucose 01/22/2018    Impaired fasting glucose    Insect bite (nonvenomous) of lower back and pelvis, initial encounter 03/29/2018    Tick bite of back    Left lower quadrant pain 01/07/2019    Left lower quadrant pain    Local infection of the skin and subcutaneous tissue, unspecified 07/28/2017    Skin infection    Low back pain, unspecified 05/23/2019    Acute low back pain    Other conditions influencing health status 02/27/2017    Sprain of right thumb, unspecified site of finger, initial encounter    Other malaise 04/16/2018    Malaise and fatigue    Other microscopic hematuria 03/30/2018    Microscopic hematuria    Other specified cough 09/07/2018    Productive cough    Other specified health status 01/07/2019    Acute medical illness    Other symptoms and signs involving the musculoskeletal system 07/12/2018     Weakness of right lower extremity    Other symptoms and signs involving the musculoskeletal system 03/21/2018    Polyarticular joint involvement    Other symptoms and signs involving the musculoskeletal system 07/12/2018    RUE weakness    Pain in right lower leg 01/27/2016    Right calf pain    Pain in unspecified hip 01/20/2016    Hip pain    Pelvic and perineal pain 04/17/2018    Pelvic pain    Personal history of diseases of the skin and subcutaneous tissue 08/07/2018    History of dermatitis    Personal history of other (healed) physical injury and trauma 08/07/2018    History of insect bite    Personal history of other diseases of the circulatory system     History of hypertension    Personal history of other diseases of the female genital tract 11/24/2015    History of menorrhagia    Personal history of other diseases of the musculoskeletal system and connective tissue 03/14/2018    History of tendinitis    Personal history of other diseases of the nervous system and sense organs 04/08/2014    History of Parkinson's disease    Personal history of other diseases of the respiratory system 01/07/2019    History of acute bronchitis    Personal history of other diseases of the respiratory system 10/05/2018    History of paranasal sinus pain    Personal history of other specified conditions 04/13/2015    History of snoring    Personal history of other specified conditions 09/11/2017    History of headache    Personal history of other specified conditions 09/07/2018    History of persistent cough    Personal history of other specified conditions 04/08/2014    History of memory loss    Personal history of other specified conditions 03/26/2018    History of left flank pain    Personal history of other specified conditions     History of heartburn    Personal history of urinary calculi 03/26/2018    Personal history of urinary calculi    Personal history of urinary calculi 03/26/2018    History of renal calculi     Plantar fascial fibromatosis 11/15/2017    Plantar fasciitis, left    Primary insomnia 04/13/2015    Primary insomnia    Rash and other nonspecific skin eruption 04/16/2018    Rash    Unspecified abdominal pain 01/07/2019    Left sided abdominal pain    Urinary tract infection, site not specified 10/19/2018    UTI (urinary tract infection), bacterial    Urinary tract infection, site not specified 10/18/2018    Recurrent UTI    Vitamin D deficiency, unspecified 04/22/2016    Vitamin D deficiency    Zoster with other complications 11/25/2015    Herpes zoster dermatitis   [3]   Past Surgical History:  Procedure Laterality Date    CT ANGIO CORONARY ART WITH HEARTFLOW IF SCORE >30%  3/18/2020    CT HEART CORONARY ANGIOGRAM 3/18/2020 [facility]    HAND TENDON SURGERY  11/11/2013    Hand Incision Tendon Sheath Of A Finger    HYSTERECTOMY  03/04/2016    Hysterectomy    LITHOTRIPSY  11/11/2013    Renal Lithotripsy    TONSILLECTOMY  12/19/2013    Tonsillectomy With Adenoidectomy   [4]   Family History  Problem Relation Name Age of Onset    Asthma Mother      Hypertension Mother      Irregular heart beat Mother      Allergies Father      Heart disease Father      Hypertension Father      Sinusitis Father      Allergies Sister      Asthma Daughter      Allergies Son      Heart disease Maternal Grandmother      Breast cancer Paternal Grandmother      Heart disease Paternal Grandfather      Lung cancer Paternal Grandfather      Dementia Paternal Great-Grandfather        No